# Patient Record
Sex: MALE | Race: WHITE | NOT HISPANIC OR LATINO | Employment: FULL TIME | URBAN - METROPOLITAN AREA
[De-identification: names, ages, dates, MRNs, and addresses within clinical notes are randomized per-mention and may not be internally consistent; named-entity substitution may affect disease eponyms.]

---

## 2017-04-20 ENCOUNTER — OFFICE VISIT - RIVER FALLS (OUTPATIENT)
Dept: FAMILY MEDICINE | Facility: CLINIC | Age: 22
End: 2017-04-20

## 2017-04-20 ASSESSMENT — MIFFLIN-ST. JEOR: SCORE: 2191.46

## 2017-08-29 ENCOUNTER — OFFICE VISIT - RIVER FALLS (OUTPATIENT)
Dept: FAMILY MEDICINE | Facility: CLINIC | Age: 22
End: 2017-08-29

## 2017-08-29 ASSESSMENT — MIFFLIN-ST. JEOR: SCORE: 2141.56

## 2017-11-20 ENCOUNTER — COMMUNICATION - RIVER FALLS (OUTPATIENT)
Dept: FAMILY MEDICINE | Facility: CLINIC | Age: 22
End: 2017-11-20

## 2017-11-20 ENCOUNTER — OFFICE VISIT - RIVER FALLS (OUTPATIENT)
Dept: FAMILY MEDICINE | Facility: CLINIC | Age: 22
End: 2017-11-20

## 2017-11-20 ASSESSMENT — MIFFLIN-ST. JEOR: SCORE: 2167.76

## 2018-01-23 ENCOUNTER — HOME INFUSION (PRE-WILLOW HOME INFUSION) (OUTPATIENT)
Dept: PHARMACY | Facility: CLINIC | Age: 23
End: 2018-01-23

## 2018-02-12 ENCOUNTER — OFFICE VISIT - RIVER FALLS (OUTPATIENT)
Dept: FAMILY MEDICINE | Facility: CLINIC | Age: 23
End: 2018-02-12

## 2018-02-12 ASSESSMENT — MIFFLIN-ST. JEOR: SCORE: 2153.24

## 2018-02-16 ENCOUNTER — TELEPHONE (OUTPATIENT)
Dept: PEDIATRICS | Facility: CLINIC | Age: 23
End: 2018-02-16

## 2018-02-16 DIAGNOSIS — E70.1 PHENYLKETONURIA (PKU) (H): ICD-10-CM

## 2018-02-16 NOTE — TELEPHONE ENCOUNTER
"Kit Orona is a 22 year old male patient with PKU. Last clinic visit at the PKU clinic was 5/24/16    His recent phe levels have been as follows: (reference range:  <6 mg/dL)  5/24/16: 15.8 mg/dL  5/17/15: 13 mg/dL  7/9/14: 15.5 mg/dL    He used to take 21 tablets of 100mg Kuvan (total of 2100mg) for PKU management, but he stopped taking Kuvan for many months. He said he would like to re-start his Kuvan therapy.    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 Encouraged patient to send phe levels to check his blood phe levels. Patient reported weight is about 140 lbs (~108 kg). 21 tablets of 100mg Kuvan per day would still be appropriate based on this weight. (recommended dose based on 20mg/kg would be between 21 and 22 tablets/day).       Plan:  1. Continue taking 21 tablets of Kuvan 100mg tab every day   2. Send phe levels     Marilu Mansfield, VijayD, Pharmacotherapy Inherited Metabolic Disease (PIMD) Fellow, per collaborative practice agreement with Dr. Nito Richardson PhD MD    "

## 2018-12-07 ENCOUNTER — HOME INFUSION (PRE-WILLOW HOME INFUSION) (OUTPATIENT)
Dept: PHARMACY | Facility: CLINIC | Age: 23
End: 2018-12-07

## 2019-01-02 NOTE — PROGRESS NOTES
This is a recent snapshot of the patient's [facility] medical record.  For current drug dose and complete information and questions, call [phone] or In Basket pool, fv home infusion ([id number])  CSN Number:  [id number]

## 2019-02-28 DIAGNOSIS — E70.1 PHENYLKETONURIA (PKU) (H): ICD-10-CM

## 2019-05-14 ENCOUNTER — OFFICE VISIT - HEALTHEAST (OUTPATIENT)
Dept: FAMILY MEDICINE | Facility: CLINIC | Age: 24
End: 2019-05-14

## 2019-05-14 ENCOUNTER — COMMUNICATION - HEALTHEAST (OUTPATIENT)
Dept: SCHEDULING | Facility: CLINIC | Age: 24
End: 2019-05-14

## 2019-05-14 DIAGNOSIS — R00.2 PALPITATIONS: ICD-10-CM

## 2019-05-14 LAB
ANION GAP SERPL CALCULATED.3IONS-SCNC: 12 MMOL/L (ref 5–18)
BUN SERPL-MCNC: 13 MG/DL (ref 8–22)
CHLORIDE BLD-SCNC: 105 MMOL/L (ref 98–107)
CO2 SERPL-SCNC: 24 MMOL/L (ref 22–31)
CREAT SERPL-MCNC: 1 MG/DL (ref 0.8–1.5)
ERYTHROCYTE [DISTWIDTH] IN BLOOD BY AUTOMATED COUNT: 11.8 % (ref 11–14.5)
GLUCOSE BLD-MCNC: 97 MG/DL (ref 70–125)
HCT VFR BLD AUTO: 46.5 % (ref 40–54)
HGB BLD-MCNC: 15.5 G/DL (ref 14–18)
MCH RBC QN AUTO: 28.5 PG (ref 27–34)
MCHC RBC AUTO-ENTMCNC: 33.2 G/DL (ref 32–36)
MCV RBC AUTO: 86 FL (ref 80–100)
PLATELET # BLD AUTO: 312 THOU/UL (ref 140–440)
PMV BLD AUTO: 7.4 FL (ref 7–10)
POTASSIUM BLD-SCNC: 4.3 MMOL/L (ref 3.5–5.5)
RBC # BLD AUTO: 5.43 MILL/UL (ref 4.4–6.2)
SODIUM SERPL-SCNC: 141 MMOL/L (ref 136–145)
TSH SERPL DL<=0.005 MIU/L-ACNC: 1.63 UIU/ML (ref 0.3–5)
WBC: 7.3 THOU/UL (ref 4–11)

## 2019-05-15 LAB
ATRIAL RATE - MUSE: 65 BPM
DIASTOLIC BLOOD PRESSURE - MUSE: NORMAL MMHG
INTERPRETATION ECG - MUSE: NORMAL
P AXIS - MUSE: 59 DEGREES
PR INTERVAL - MUSE: 156 MS
QRS DURATION - MUSE: 102 MS
QT - MUSE: 402 MS
QTC - MUSE: 418 MS
R AXIS - MUSE: 57 DEGREES
SYSTOLIC BLOOD PRESSURE - MUSE: NORMAL MMHG
T AXIS - MUSE: 45 DEGREES
VENTRICULAR RATE- MUSE: 65 BPM

## 2019-07-31 ENCOUNTER — HOSPITAL ENCOUNTER (OUTPATIENT)
Facility: CLINIC | Age: 24
Setting detail: SPECIMEN
Discharge: HOME | End: 2019-07-31
Admitting: PEDIATRICS

## 2019-07-31 PROCEDURE — 84510 ASSAY OF TYROSINE: CPT | Performed by: PEDIATRICS

## 2019-08-07 LAB
PHE SERPL-MCNC: 16 MG/DL (ref 0.5–1.6)
TYROSINE SERPL-MCNC: 1.8 MG/DL (ref 0.6–2.4)

## 2019-08-09 ENCOUNTER — TELEPHONE (OUTPATIENT)
Dept: NUTRITION | Facility: CLINIC | Age: 24
End: 2019-08-09

## 2019-09-26 ENCOUNTER — HOME INFUSION (PRE-WILLOW HOME INFUSION) (OUTPATIENT)
Dept: PHARMACY | Facility: CLINIC | Age: 24
End: 2019-09-26

## 2019-10-02 ENCOUNTER — COMMUNICATION - HEALTHEAST (OUTPATIENT)
Dept: PEDIATRICS | Facility: CLINIC | Age: 24
End: 2019-10-02

## 2019-10-03 NOTE — PROGRESS NOTES
This is a recent snapshot of the patient's [facility] medical record.  For current drug dose and complete information and questions, call [phone] or In Basket pool, fv home infusion ([id number])  CSN Number:  [id number]

## 2020-07-09 ENCOUNTER — HOME INFUSION (PRE-WILLOW HOME INFUSION) (OUTPATIENT)
Dept: PHARMACY | Facility: CLINIC | Age: 25
End: 2020-07-09

## 2020-07-16 NOTE — PROGRESS NOTES
This is a recent snapshot of the patient's [facility] medical record.  For current drug dose and complete information and questions, call [phone] or In Basket pool, fv home infusion ([id number])  CSN Number:  [id number]

## 2020-11-03 ENCOUNTER — COMMUNICATION - HEALTHEAST (OUTPATIENT)
Dept: INTERNAL MEDICINE | Facility: CLINIC | Age: 25
End: 2020-11-03

## 2021-01-25 ENCOUNTER — HOME INFUSION (PRE-WILLOW HOME INFUSION) (OUTPATIENT)
Dept: PHARMACY | Facility: CLINIC | Age: 26
End: 2021-01-25

## 2021-01-28 NOTE — PROGRESS NOTES
This is a recent snapshot of the patient's [facility] medical record.  For current drug dose and complete information and questions, call [phone] or In Basket pool, fv home infusion ([id number])  CSN Number:  [id number]

## 2021-05-28 NOTE — PATIENT INSTRUCTIONS - HE
I will call you with lab results.    Recommend seeing primary care if this persists to get a holter monitor evaluation.

## 2021-05-28 NOTE — TELEPHONE ENCOUNTER
Pt called in states he has palpitation 2 days  ago.  The symptom was happened last Sunday night until Monday morning.  The symptom was constant.  Pt pulse is 88  Pt states he has family history of palpitation.  Pt states the symptom is recurrent.  Pt states he didn't have history of heart disease.  No dizziness, no chest pain, no sweating, no difficulty breathing.  The disposition is home care.  Care advice given per protocol.  Patient agrees with care advice given.   Agreed to call back if he has additional symptoms or questions.    Augustine Izaguirre RN, Care Connection Triage/Med Refill 5/14/2019 3:37 PM      Reason for Disposition    Palpitations    Protocols used: HEART RATE AND HEARTBEAT [id number]

## 2021-05-28 NOTE — PROGRESS NOTES
ASSESSMENT:   1. Palpitations  Electrocardiogram Perform and Read    HM2(CBC w/o Differential)    ISTAT CHEM 7 (HE CLINIC ONLY)    Thyroid Stimulating Hormone (TSH)       PLAN:  Patient has remained asymptomatic for the past 2 days, with one episode of intermittent palpitations 2 nights ago following heavy caffeine consumption.  EKG was obtained today, no ectopy or ischemic changes are identified.  Labs were obtained, no significant anemia or electrolyte derangement.  TSH is pending, patient can be contacted once those results are available.  Did recommend decreasing caffeine intake, avoiding alcohol.  Also recommended that should symptoms persist, patient should be seen by primary care for likely Holter monitor evaluation.  He is amenable with this plan.    I discussed red flag symptoms, return precautions to clinic/ER and follow up care with patient/parent.  Expected clinical course, symptomatic cares advised. Questions answered. Patient/parent amenable with plan.    Patient Instructions:  Patient Instructions   I will call you with lab results.    Recommend seeing primary care if this persists to get a holter monitor evaluation.      SUBJECTIVE:   Kit Orona is a 23 y.o. male who presents today with report of fluttering in his chest on Sunday (today is Tuesday).  Feels it lasted about 12 hours then resolved, has not occurred since.  No associated chest pain, shortness of breath.  Did feel lightheaded immediately when it began, but that resolved with rest.  Was playing softball when it began.  Does drink coffee, pop and energy drinks.  Did consume an energy drink containing 300mg caffeine about 3 hours prior to start of the game. Non-smoker. No illicit drugs.  Drinks on weekends, did have several drinks the evening  Notes his dad and grandfather both have A-fib and is concerned for this.        ROS:  Comprehensive 12 pt ROS completed, positives noted in HPI, otherwise negative.      Past Medical  History:  Patient Active Problem List   Diagnosis     Phenylketonuria       Surgical History:  No past surgical history on file.        Family History:  Family History   Problem Relation Age of Onset     No Medical Problems Mother      No Medical Problems Father      No Medical Problems Sister      No Medical Problems Brother        Reviewed; Non-contributory    Social History     Tobacco Use   Smoking Status Never Smoker   Smokeless Tobacco Never Used       Current Medications:  Current Outpatient Medications on File Prior to Visit   Medication Sig Dispense Refill     ibuprofen (ADVIL,MOTRIN) 200 MG tablet Take 200 mg by mouth every 6 (six) hours as needed for pain.       No current facility-administered medications on file prior to visit.        Allergies:   No Known Allergies    OBJECTIVE:   Vitals:    05/14/19 1614 05/14/19 1618   BP: 145/83 126/78   Patient Site: Right Arm Right Arm   Patient Position: Sitting Sitting   Cuff Size: Adult Large Adult Large   Pulse: 85    Resp: 16    Temp: 97.9  F (36.6  C)    TempSrc: Oral    SpO2: 98%    Weight: (!) 269 lb (122 kg)      Physical exam reveals a pleasant 23 y.o. male.   General: Appears healthy, alert and cooperative. Non-toxic appearance.  Pulmonary/Chest: Chest is clear, no wheezing, rhonchi or rales. Symmetric air entry throughout both lung fields. Symmetrical chest wall movement.  Heart: regular rate and rhythm, no murmur, rub or gallop  Abdomen: soft, nontender. No masses or organomegaly  Neuro: Alert, oriented. Non-focal.  Skin: pink, warm, dry, and without lesions on limited skin exam. No rashes.  Psychiatric: Normal mood and affect.  Normal judgement and thought content. Normal behavior.       RADIOLOGY    none  LABORATORY STUDIES    Recent Results (from the past 24 hour(s))   Electrocardiogram Perform and Read   Result Value Ref Range    SYSTOLIC BLOOD PRESSURE  mmHg    DIASTOLIC BLOOD PRESSURE  mmHg    VENTRICULAR RATE 65 BPM    ATRIAL RATE 65 BPM    P-R  INTERVAL 156 ms    QRS DURATION 102 ms    Q-T INTERVAL 402 ms    QTC CALCULATION (BEZET) 418 ms    P Axis 59 degrees    R AXIS 57 degrees    T AXIS 45 degrees    MUSE DIAGNOSIS       Normal sinus rhythm with sinus arrhythmia  Normal ECG  No previous ECGs available     HM2(CBC w/o Differential)   Result Value Ref Range    WBC 7.3 4.0 - 11.0 thou/uL    RBC 5.43 4.40 - 6.20 mill/uL    Hemoglobin 15.5 14.0 - 18.0 g/dL    Hematocrit 46.5 40.0 - 54.0 %    MCV 86 80 - 100 fL    MCH 28.5 27.0 - 34.0 pg    MCHC 33.2 32.0 - 36.0 g/dL    RDW 11.8 11.0 - 14.5 %    Platelets 312 140 - 440 thou/uL    MPV 7.4 7.0 - 10.0 fL   ISTAT CHEM 7 (HE CLINIC ONLY)   Result Value Ref Range    Sodium 141 136 - 145 mmol/L    Potassium 4.3 3.5 - 5.5 mmol/L    CO2 24 22 - 31 mmol/L    Chloride 105 98 - 107 mmol/L    Anion Gap, Calculation 12 5 - 18 mmol/L    Glucose 97 70 - 125 mg/dL    BUN 13 8 - 22 mg/dL    Creatinine 1.00 0.80 - 1.50 mg/dL           Lorna Choudhury, CNP

## 2021-06-03 VITALS — WEIGHT: 269 LBS | BODY MASS INDEX: 35.65 KG/M2

## 2021-06-12 NOTE — TELEPHONE ENCOUNTER
Referral Request  Type of referral: PKU specialist  Who s requesting: Patient  Why the request: Genetic testing  Have you been seen for this request: N/A  Does patient have a preference on a group/provider? [facility] Fax [phone]  Okay to leave a detailed message?  Yes

## 2021-06-12 NOTE — TELEPHONE ENCOUNTER
"I am sorry but Kit needs to establish with an adult provider for this referral.   The PCP on his chart is a pediatric metabolic specialist, not a primary care provider, please change to \"no PCP.\"  Please call him and help schedule a visit with a primary care provider. Thanks.  "

## 2021-06-12 NOTE — TELEPHONE ENCOUNTER
Called to Kit and he will check with his insurance and see which clinic he can go to establish care for this. Wanda Little LPN

## 2021-09-11 ENCOUNTER — HEALTH MAINTENANCE LETTER (OUTPATIENT)
Age: 26
End: 2021-09-11

## 2022-01-25 ENCOUNTER — TELEPHONE (OUTPATIENT)
Dept: CONSULT | Facility: CLINIC | Age: 27
End: 2022-01-25
Payer: COMMERCIAL

## 2022-01-25 NOTE — TELEPHONE ENCOUNTER
I left Kit Orona a voice message about setting a virtual visit in the PKU clinic to meet with Dr. Richardson for sometime in March. Dr. Richardson would like to follow-up on how the patient s progress is coming along. Please feel free to contact Dr. Richardson s coordinator Lianet at [phone] or the Genetics appointment scheduling at [phone] and we ll be happy to help coordinate an appointment with the patient.       Thank you,   Lianet Kapoor

## 2022-02-11 VITALS
BODY MASS INDEX: 34.21 KG/M2 | TEMPERATURE: 100.6 F | DIASTOLIC BLOOD PRESSURE: 52 MMHG | HEIGHT: 72 IN | HEART RATE: 88 BPM | SYSTOLIC BLOOD PRESSURE: 132 MMHG | WEIGHT: 252.6 LBS

## 2022-02-11 VITALS
WEIGHT: 244.2 LBS | HEIGHT: 73 IN | SYSTOLIC BLOOD PRESSURE: 112 MMHG | HEART RATE: 65 BPM | BODY MASS INDEX: 32.37 KG/M2 | DIASTOLIC BLOOD PRESSURE: 74 MMHG | OXYGEN SATURATION: 99 %

## 2022-02-11 VITALS
OXYGEN SATURATION: 98 % | BODY MASS INDEX: 33.78 KG/M2 | HEART RATE: 75 BPM | WEIGHT: 249.4 LBS | DIASTOLIC BLOOD PRESSURE: 64 MMHG | TEMPERATURE: 98.5 F | SYSTOLIC BLOOD PRESSURE: 120 MMHG | HEIGHT: 72 IN

## 2022-02-11 VITALS
SYSTOLIC BLOOD PRESSURE: 126 MMHG | BODY MASS INDEX: 33.82 KG/M2 | DIASTOLIC BLOOD PRESSURE: 62 MMHG | WEIGHT: 255.2 LBS | HEIGHT: 73 IN | HEART RATE: 68 BPM

## 2022-02-16 NOTE — PROGRESS NOTES
Patient:   CLIF GEE            MRN: [id number]            FIN: [id number]               Age:   21 years     Sex:  Male     :  1995   Associated Diagnoses:   Multiple benign nevi   Author:   Hussein Wolfe MD      Visit Information      Date of Service: 2017 01:00 pm  Performing Location: [facility]  Encounter#: [id number]      Primary Care Provider (PCP):  NONE ,       Referring Provider:  Hussein Wolfe MD    NPI# [id number]      Chief Complaint   2017 1:14 PM CDT    Pt here to have moles checked, states on his back, GF is worried about them, he thinks they are ok, he denies any changing in the moles        History of Present Illness        The patient presents for a screening exam.  The screening exam is requested due to no family history of basal cell carcinoma, no family history of squamous cell carcinoma, no family history of melanoma, no family history of dysplastic nevi, no personal history of basal cell carcinoma, no personal history of squamous cell carcinoma, no personal history of melanoma and no personal history of dysplastic nevi.  Additional pertinent history: patient has a previous history of tanning bed use and no allergies.        Review of Systems   Integumentary      Health Status   Allergies:    Allergic Reactions (Selected)  No Known Medication Allergies   Medications:  (Selected)      Problem list:    All Problems  Obesity / SNOMED CT 7424809430 / Probable      Histories   Family History:    Entire family history is negative.   Procedure history:    None (SNOMED CT 880445171).      Physical Examination   Vital Signs   2017 1:14 PM CDT Peripheral Pulse Rate 65 bpm    Pulse Site Radial artery    Systolic Blood Pressure 112 mmHg    Diastolic Blood Pressure 74 mmHg    Mean Arterial Pressure 87 mmHg    BP Site Right arm    Oxygen Saturation 99 %      Measurements from flowsheet : Measurements   2017 1:14 PM CDT Height Measured - Standard  73 in    Weight Measured - Standard 244.2 lb    BSA 2.39 m2    Body Mass Index 32.21 kg/m2      General:  Alert and oriented, Skin.         Appearance: Within normal limits.    Integumentary:  Warm, Dry, Intact.         Skin phototype: Skin phototype- II. Usually burns, sometimes tans. Fair skin.    Integumentary exam.      Head and neck:  multiple benign nevi    Upper extremities:  multiple benign nevi    Torso:  multiple benign nevi    Lower extremities:  multiple benign nevi      Impression and Plan   Diagnosis     Multiple benign nevi (TNM45-ZB D22.9).     Course:  Progressing as expected.    Plan:  Patient counseled on sun protection, melanoma, basal cell carcinoma, and squamous cell carcinoma.  Patient education materials given.  Advise yearly skin checks..         Follow-up: In 12 months.    Patient Instructions:  SHREYAS martinez (POLI).

## 2022-02-16 NOTE — PROGRESS NOTES
Patient:   CLIF GEE            MRN: [id number]            FIN: [id number]               Age:   21 years     Sex:  Male     :  1995   Associated Diagnoses:   Rhomboid muscle strain   Author:   Anand Lino MD      Chief Complaint   2017 1:17 PM CDT    c/o upper right side back pain since Monday. Has been icing and putting heat on it.      History of Present Illness   See chief complaint as noted above and confirmed with the patient   21year old patient presents today with complaints of back pain.  The pain started 3 days ago.  Pain started after football practice earlier this week. He states this practice was a little more intense than normal and he felt really tight during practice.  Pain Location:  right upper back around shoulder blade  Pain Exacerbated by:  bending over  Pain Relieved by:  heat, icing, and message  Previous Treatment?:  none  Has seen a chiropractor in the past for lower back pain in the past.      Review of Systems   Constitutional:  Negative.    Respiratory:  Negative.    Cardiovascular:  Negative.    Gastrointestinal:  Negative.    Musculoskeletal:  Back pain.    Integumentary:  Negative.    Psychiatric:  Negative.              Health Status   Allergies:    Allergic Reactions (Selected)  No Known Medication Allergies   Medications:  (Selected)      Problem list:    All Problems  Obesity / SNOMED CT 6516472529 / Probable      Histories   Past Medical History:    No active or resolved past medical history items have been selected or recorded.   Family History:    Entire family history is negative.   Procedure history:    None ([id number]).   Social History:        Alcohol Assessment            Current, 1-2 times per week                     Comments:                      2016 - Jim Richardson CMA                     6-8 drinks per time      Tobacco Assessment            Never      Substance Abuse Assessment            Never      Employment and Education Assessment             Student      Home and Environment Assessment            Marital status: Single.  Risks in environment: Does not wear helmet, Smoke/CO detectors absent.      Nutrition and Health Assessment            Type of diet: Regular.      Exercise and Physical Activity Assessment            Exercise frequency: 1-2 times/week.      Sexual Assessment            Sexually active: No.        Physical Examination   Vital Signs   4/20/2017 1:17 PM CDT Peripheral Pulse Rate 68 bpm    Systolic Blood Pressure 126 mmHg    Diastolic Blood Pressure 62 mmHg    Mean Arterial Pressure 83 mmHg      Measurements from flowsheet : Measurements   4/20/2017 1:17 PM CDT Height Measured - Standard 73 in    Weight Measured - Standard 255.2 lb    BSA 2.44 m2    Body Mass Index 33.67 kg/m2      General:  Alert and oriented, No acute distress.    Eye:  Pupils are equal, round and reactive to light, Normal conjunctiva.    HENT:  Oral mucosa is moist.    Neck:  Supple.    Respiratory:  Respirations are non-labored.    Cardiovascular:  Normal rate, Regular rhythm, No edema.    Gastrointestinal:  Non-distended.    Integumentary:  Warm, No rash.    Psychiatric:  Cooperative, Appropriate mood & affect, Normal judgment.       Review / Management   Results review      Impression and Plan   Diagnosis     Rhomboid muscle strain (LAB30-KQ S29.012A).     Plan:  Patient has symptoms consistent with musculoskeletal back pain.  Doubt fracture.  No evidence of any high risk symptoms.  Discussed options for treatment including NSAIDs, ice, heat, and stretching.  Also can consider physical therapy, massage or chiropractic care if interested.    If pain continues or worsens in severity patient instructed to make follow up appointment, urgently if any numbness, tingling,  loss of bowel or bladder develop.  Patient in agreement..    I, Elisabeth Crowell CMA, acted solely as a scribe for, and in the presence of Dr. Anand Lino who performed the service.

## 2022-02-16 NOTE — PROGRESS NOTES
Patient:   CILF GEE            MRN: [id number]            FIN: [id number]               Age:   22 years     Sex:  Male     :  1995   Associated Diagnoses:   Viral URI with cough   Author:   Hussein Wallace MD      Chief Complaint   2018 8:06 AM CST    pt c/o cough with yellow/green mucus since Friday, previously had flu like symptoms        History of Present Illness   22 year old male presents with cough. A week ago began with respiratory flu. Had a cough that hurt. Also had bodyaches. Flu symptoms went away. Then developed a productive cough that is stable. He is actually getting a little better.      Review of Systems   Constitutional:  No fever.    Respiratory:  No shortness of breath.    Gastrointestinal:  No vomiting.    Integumentary:  No rash.       Health Status   Allergies:    Allergic Reactions (Selected)  No Known Medication Allergies   Medications:    Medications          No Recorded Medications     Problem list:    All Problems  Obesity / SNOMED CT 8643390922 / Probable      Histories   Family History:    Entire family history is negative.   Social History: [employer] student, home [place], [place]      Physical Examination   Vital Signs   2018 8:06 AM CST Temperature Tympanic 98.5 DegF    Peripheral Pulse Rate 75 bpm    Pulse Site Radial artery    HR Method Manual    Systolic Blood Pressure 120 mmHg    Diastolic Blood Pressure 64 mmHg    Mean Arterial Pressure 83 mmHg    BP Site Right arm    BP Method Manual    Oxygen Saturation 98 %      Measurements from flowsheet : Measurements   2018 8:06 AM CST Height Measured - Standard 72.25 in    Weight Measured - Standard 249.4 lb    BSA 2.4 m2    Body Mass Index 33.59 kg/m2  HI      General:  No acute distress.    HENT:  No pharyngeal erythema.    Neck:  No lymphadenopathy.    Respiratory:  Lungs are clear to auscultation.    Cardiovascular:  Normal rate, Regular rhythm.    Musculoskeletal:  Normal gait.    Psychiatric:  Appropriate  mood & affect.       Impression and Plan   Bronchitis:       Diagnosis: Viral URI with cough (UEL09-CT J06.9).    Viral URI with cough: symptomatic with treatment and follow up if not improving

## 2022-02-27 ENCOUNTER — LAB (OUTPATIENT)
Dept: LAB | Facility: CLINIC | Age: 27
End: 2022-02-27
Payer: COMMERCIAL

## 2022-02-27 DIAGNOSIS — E70.1 PHENYLKETONURIA (PKU) (H): ICD-10-CM

## 2022-02-27 PROCEDURE — 36416 COLLJ CAPILLARY BLOOD SPEC: CPT

## 2022-03-01 ENCOUNTER — VIRTUAL VISIT (OUTPATIENT)
Dept: PEDIATRICS | Facility: CLINIC | Age: 27
End: 2022-03-01
Attending: PEDIATRICS
Payer: COMMERCIAL

## 2022-03-01 ENCOUNTER — TELEPHONE (OUTPATIENT)
Dept: CONSULT | Facility: CLINIC | Age: 27
End: 2022-03-01
Payer: COMMERCIAL

## 2022-03-01 ENCOUNTER — TELEPHONE (OUTPATIENT)
Dept: CONSULT | Facility: CLINIC | Age: 27
End: 2022-03-01

## 2022-03-01 VITALS — BODY MASS INDEX: 33.75 KG/M2 | WEIGHT: 263 LBS | HEIGHT: 74 IN

## 2022-03-01 DIAGNOSIS — E70.1 PHENYLKETONURIA (PKU) (H): Primary | ICD-10-CM

## 2022-03-01 PROCEDURE — 99203 OFFICE O/P NEW LOW 30 MIN: CPT | Mod: GT | Performed by: PEDIATRICS

## 2022-03-01 RX ORDER — PEGVALIASE-PQPZ 2.5 MG/.5ML
INJECTION, SOLUTION SUBCUTANEOUS
Qty: 3 ML | Refills: 0 | Status: SHIPPED | OUTPATIENT
Start: 2022-03-01 | End: 2022-04-13 | Stop reason: DRUGHIGH

## 2022-03-01 RX ORDER — PEGVALIASE-PQPZ 10 MG/.5ML
INJECTION, SOLUTION SUBCUTANEOUS
Qty: 7 ML | Refills: 0 | Status: SHIPPED | OUTPATIENT
Start: 2022-03-01 | End: 2022-08-12

## 2022-03-01 RX ORDER — CETIRIZINE HYDROCHLORIDE 10 MG/1
10 TABLET ORAL DAILY
Qty: 30 TABLET | Refills: 11 | Status: SHIPPED | OUTPATIENT
Start: 2022-03-01

## 2022-03-01 RX ORDER — EPINEPHRINE 0.3 MG/.3ML
INJECTION SUBCUTANEOUS
Qty: 0.6 ML | Refills: 11 | Status: SHIPPED | OUTPATIENT
Start: 2022-03-01 | End: 2024-01-23

## 2022-03-01 RX ORDER — FAMOTIDINE 10 MG
10 TABLET ORAL 2 TIMES DAILY
Qty: 60 TABLET | Refills: 11 | Status: SHIPPED | OUTPATIENT
Start: 2022-03-01 | End: 2024-01-23

## 2022-03-01 RX ORDER — PEGVALIASE-PQPZ 20 MG/ML
INJECTION, SOLUTION SUBCUTANEOUS
Qty: 30 ML | Refills: 6 | Status: SHIPPED | OUTPATIENT
Start: 2022-03-01 | End: 2022-08-12

## 2022-03-01 ASSESSMENT — PAIN SCALES - GENERAL: PAINLEVEL: NO PAIN (0)

## 2022-03-01 NOTE — LETTER
"  3/1/2022      RE: Kit Orona  [address] [address]               Phenylketonuria (PKU) Clinic  [facility] [address] [place] [address]  Phone: [phone]  Fax: [phone]  Date: 2022      Patient:  Kit Orona   :   1995   MRN:     [id number]      Kit Orona  [address] [address]    Dear Kit Orona,    CHIEF COMPLAINT:     I had the pleasure of seeing Kit Orona in the PKU and Maternal PKU Clinic at the [facility] regarding phenylketonuria (PKU). This here for evaluation and treatment.      Since the last visit, there have been no hospitalizations, emergency department visits, or other major changes in medical care.  Reji stopped taking Kuvan and would like to start Palynziq injectable medication.  His last phe level was in 2019 with a value of 16 mg/dL    PAST MEDICAL HISTORY:    These list of past medical problems includes:    Patient Active Problem List   Diagnosis     Phenylketonuria (PKU) - Kuvan responsive     FAMILY HISTORY: A brief family medical history was reviewed.  MEDICATIONS:   Current Outpatient Medications   Medication Sig     Amino Acids (XPHE MAXAMUM) POWD Take 200 g by mouth daily     sapropterin dihydrochloride (KUVAN) 100 MG solu-tablet Take 21 tablets of Kuvan 100 mg for a total dose of 2100 mg by mouth once daily with a meal.     No current facility-administered medications for this visit.     REVIEW OF SYSTEMS: The review of systems negative for new eye, ear, heart, lung, liver, spleen, gastrointestinal, bone, muscle, integumentary, endocrinologic, brain or psychiatric issues except as noted above.  PHYSICAL EXAMINATION:  Demographics: Ht 6' 2\" (188 cm)   Wt 263 lb (119.3 kg)   BMI 33.77 kg/m    General: The patient is oriented to person, place and time at an age-appropriate manner.   HEENT: The facial features are normal and symmetric.   The gaze is conjugate " "and extraocular motions are full and intact.The pupils are equal, round and reactive to light.  The ears are of normal position and configuration and hearing is grossly normal.  The oropharynx is benign and the tongue protrudes normally without fasciculations.  Neck: The neck is supple with full range of motion  Chest: The chest is of normal configuration and clear by auscultation.   Heart: The patient appears to be well perfused.  Abdomen: Not examined.   Extremities: The extremities are of normal configuration without contractures nor hyperlaxities. Strength and tone appear to be normal and symmetric.  Integument: The integument is  of normal appearance without significant changes in pigmentation, birthmarks, or lesions.  Neurologic:  Mental Status Exam:  Alert, awake. Fully oriented. No dysarthria, no dysphasia. Speech of normal fluency.  Cranial Nerves:  EOMs intact, no nystagmus, facial movements symmetric.   Motor:  Normal movement of all four extremities, no atrophy or fasciculations.No tremors.  Reflexes:  Not done.  Gait:  Normal gait in limited space of camera field.    LABORATORY RESULTS: Previous studies showed pathologically elevated blood phenylalanine levels as well as specific PAH mutatons and excluded disorders of biopterin recycling. Laboratory studies from the past year were reviewed.  ASSESSMENT:  1. Phenylketonuria (PKU)  2. Was on Kuvan and would like to start Palynziq  3. Kuvan responder  4. On PKU formula  5. Diet controlled PKU  6. Inadequate (phe >15 mgldL)  control with blood phe levels aiming at levels of 6 mg/dL or lower.    PLAN/RECOMMENDATIONS:  1. Continue low phenylalanine diet  2. Begin Palynziq  3. Send blood \"tyrosine and phenylalanine\" levels monthly  4. Clinical pharmacotherapy consult with Medardo Monroe, Pharm.D.  5. Metabolic PKU Dietician Consult today for regular diet assessment and nutritional management including the patient's prescribed phenylalanine intake.  6. Return to " PKU Clinic in 3 months.    FOLLOW-UP PLAN:  If you are returning to clinic to review specific laboratory tests, please call the Genetic Counselor (see phone numbers below) to confirm that we have received all of the results from reference laboratories prior to your appointment. If we have not received all of the test results, please discuss re-scheduling your appointment.    With warmest regards,     Zach Richardson PhD MD  Medical Director, PKU Clinic  Professor of Pediatrics, Medical School, and  Experimental and Clinical Pharmacology, [place] [facility]    Appointments: [phone]      Monday mornings: [facility] for Lysosomal Diseases Clinic   Tuesday mornings: PKU Clinic, Metabolism Clinic, and Genetics Clinic    Pharmacotherapy Consultant:  Medardo Monroe, PharmD, Pharmacotherapy for Metabolic Disorders (PIMD): [phone]    Genetic Counselor:  Yael Santos MS, [facility] (Genetic counselor): [phone]    Metabolic Dietician:  Alysia Nunn, Registered Dietician: [phone]    Advanced Therapies Clinic Scheduler:  Lianet Kapoor, [phone]    Nurse Coordinator, PKU, Metabolism and Genetics:  Miriam Mason RN, [phone]    Copies:    Kit Orona  [address] [facility] [address] [facility] [address]

## 2022-03-01 NOTE — TELEPHONE ENCOUNTER
PA Initiation    Medication: Palynziq 2.5mg-PA initiated  Insurance Company: [employer] ([facility]) - Phone [phone] Fax [phone]  Pharmacy Filling the Rx:    Filling Pharmacy Phone:    Filling Pharmacy Fax:    Start Date: 3/1/2022

## 2022-03-01 NOTE — PROGRESS NOTES
Kit is a 26 year old who is being evaluated via a billable video visit.      How would you like to obtain your AVS? MyChart  If the video visit is dropped, the invitation should be resent by: Send to e-mail at: [email or web address] or [email or web address]   Will anyone else be joining your video visit? Dr. Medardo Monroe (Clinical Pharmacist), Dr. Helen Stallworth (Research physician) and Dr. Leslee Judge (Clinical physician)    START:    END:            Aby Marina M.A.                Phenylketonuria (PKU) Clinic  [facility] [address] [place] [address]  Phone: [phone]  Fax: [phone]  Date: 2022      Patient:  Kit Orona   :   1995   MRN:     [id number]      Kit Orona  [address] [facility] [address]    Dear Kit ROY Orona,    CHIEF COMPLAINT:     I had the pleasure of seeing Kit Orona in the PKU and Maternal PKU Clinic at the [facility] regarding phenylketonuria (PKU). This here for evaluation and treatment.      Since the last visit, there have been no hospitalizations, emergency department visits, or other major changes in medical care.  Reji stopped taking Kuvan and would like to start Palynziq injectable medication.  His last phe level was in 2019 with a value of 16 mg/dL    PAST MEDICAL HISTORY:    These list of past medical problems includes:    Patient Active Problem List   Diagnosis     Phenylketonuria (PKU) - Kuvan responsive     FAMILY HISTORY: A brief family medical history was reviewed.  MEDICATIONS:   Current Outpatient Medications   Medication Sig     Amino Acids (XPHE MAXAMUM) POWD Take 200 g by mouth daily     sapropterin dihydrochloride (KUVAN) 100 MG solu-tablet Take 21 tablets of Kuvan 100 mg for a total dose of 2100 mg by mouth once daily with a meal.     No current facility-administered medications for this visit.     REVIEW OF SYSTEMS: The review of systems negative for new eye,  "ear, heart, lung, liver, spleen, gastrointestinal, bone, muscle, integumentary, endocrinologic, brain or psychiatric issues except as noted above.  PHYSICAL EXAMINATION:  Demographics: Ht 6' 2\" (188 cm)   Wt 263 lb (119.3 kg)   BMI 33.77 kg/m    General: The patient is oriented to person, place and time at an age-appropriate manner.   HEENT: The facial features are normal and symmetric.   The gaze is conjugate and extraocular motions are full and intact.The pupils are equal, round and reactive to light.  The ears are of normal position and configuration and hearing is grossly normal.  The oropharynx is benign and the tongue protrudes normally without fasciculations.  Neck: The neck is supple with full range of motion  Chest: The chest is of normal configuration and clear by auscultation.   Heart: The patient appears to be well perfused.  Abdomen: Not examined.   Extremities: The extremities are of normal configuration without contractures nor hyperlaxities. Strength and tone appear to be normal and symmetric.  Integument: The integument is  of normal appearance without significant changes in pigmentation, birthmarks, or lesions.  Neurologic:  Mental Status Exam:  Alert, awake. Fully oriented. No dysarthria, no dysphasia. Speech of normal fluency.  Cranial Nerves:  EOMs intact, no nystagmus, facial movements symmetric.   Motor:  Normal movement of all four extremities, no atrophy or fasciculations.No tremors.  Reflexes:  Not done.  Gait:  Normal gait in limited space of camera field.    LABORATORY RESULTS: Previous studies showed pathologically elevated blood phenylalanine levels as well as specific PAH mutatons and excluded disorders of biopterin recycling. Laboratory studies from the past year were reviewed.  ASSESSMENT:  1. Phenylketonuria (PKU)  2. Was on Kuvan and would like to start Palynziq  3. Kuvan responder  4. On PKU formula  5. Diet controlled PKU  6. Inadequate (phe >15 mgldL)  control with blood phe " "levels aiming at levels of 6 mg/dL or lower.    PLAN/RECOMMENDATIONS:  1. Continue low phenylalanine diet  2. Begin Palynziq  3. Send blood \"tyrosine and phenylalanine\" levels monthly  4. Clinical pharmacotherapy consult with Medardo Monroe, Pharm.D.  5. Metabolic PKU Dietician Consult today for regular diet assessment and nutritional management including the patient's prescribed phenylalanine intake.  6. Return to PKU Clinic in 3 months.    FOLLOW-UP PLAN:  If you are returning to clinic to review specific laboratory tests, please call the Genetic Counselor (see phone numbers below) to confirm that we have received all of the results from reference laboratories prior to your appointment. If we have not received all of the test results, please discuss re-scheduling your appointment.    With warmest regards,     Zach Richardson PhD MD  Medical Director, PKU Clinic  Professor of Pediatrics, Medical School, and  Experimental and Clinical Pharmacology, College [employer]    Appointments: [phone]      Monday mornings: Advanced Therapies for Lysosomal Diseases Clinic   Tuesday mornings: PKU Clinic, Metabolism Clinic, and Genetics Clinic    Pharmacotherapy Consultant:  Medardo Monroe, VijayD, Pharmacotherapy for Metabolic Disorders (PIMD): [phone]    Genetic Counselor:  Yael Santos MS, [facility] (Genetic counselor): [phone]    Metabolic Dietician:  Alysia Nunn, Registered Dietician: [phone]    Advanced Therapies Clinic Scheduler:  Lianet Kapoor [phone]    Nurse Coordinator, PKU, Metabolism and Genetics:  Miriam Mason RN, [phone]    Copies:    Kit Orona  [address] [place] [id number]           "

## 2022-03-01 NOTE — TELEPHONE ENCOUNTER
I was unable to reach Kit and had left him a voice message about settling up a follow up virtual visit in the PKU clinic to meet with Dr. Richardson. Dr. Richardson had requested to see Kit back in three months. Kit can reach me at [phone] and I can help get him scheduled to meet with Dr. Richardson.       Thank you,   Lianet Kaporo

## 2022-03-01 NOTE — PATIENT INSTRUCTIONS
"Pediatric Metabolism/PKU Clinic  [facility]  Pediatric Specialty Clinic ([facility])      Formula   We did not make any changes to your formula today.   We will review the lab results and call you with our recommendations.  *Do not make any formula changes without first speaking to your dietician or doctor.       Medications   We did not make any changes to your medications today. We will review the lab results and call you with our recommendations.  *Do not make any medication changes without first speaking to your doctor.  **Please contact us at least one week in advance during regular business hours if you are about to run out of formula or medication       Emergency & Sick Calls   Keep your emergency letter with your child at all times  (at their school, in your vehicles, purse/bag and home, etc).  Consider making a medical alert bracelet.    If your child is unresponsive or has other life threatening medical emergency YOU SHOULD CALL 911.     If your child is NOT ACTING NORMALLY such as: confused or sleepier than normal, having nausea or vomiting, not tolerating their formula or medications, breathing faster than normal, has a fever, diarrhea, or other parental concern CALL US IMMEDIATELY.     ? Call [phone] and ask the  to \"page Genetic Metabolic Physician on-call\"   ? If no one calls you back within 15 minutes call again.        Helpful Numbers   To schedule appointments:  Pediatric Call Center for [facility]: [phone]  Neuropsychology [phone]   Radiology/ Imaging/ Echocardiogram: [phone]   Services:   [phone]     For questions about medications/ supplies or non-urgent medical concerns:        Miriam CAMERON, RN, PHN  Nurse Care Coordinator               Ph: [phone]        Shama Morris APRN, CNP             Ph: [phone]    For questions about your child's nutrition:  Alysia Nunn RD  Ph: [phone]    For " questions about genetic counseling:                    If you have not already done so consider signing up for MeMeMe by speaking with the person at the  on your way out or go to [email or web address] to sign up online.      You should receive a phone call about your next appointment. If you do not receive this within two weeks of your visit, please call [phone].

## 2022-03-03 DIAGNOSIS — E70.1 PHENYLKETONURIA (PKU) (H): Primary | ICD-10-CM

## 2022-03-03 RX ORDER — DIPHENHYDRAMINE HYDROCHLORIDE 50 MG/ML
50 INJECTION INTRAMUSCULAR; INTRAVENOUS
Status: CANCELLED
Start: 2022-03-03

## 2022-03-03 RX ORDER — ALBUTEROL SULFATE 0.83 MG/ML
2.5 SOLUTION RESPIRATORY (INHALATION)
Status: CANCELLED | OUTPATIENT
Start: 2022-03-03

## 2022-03-03 RX ORDER — ACETAMINOPHEN 325 MG/1
650 TABLET ORAL ONCE
Status: CANCELLED
Start: 2022-03-03

## 2022-03-03 RX ORDER — METHYLPREDNISOLONE SODIUM SUCCINATE 125 MG/2ML
125 INJECTION, POWDER, LYOPHILIZED, FOR SOLUTION INTRAMUSCULAR; INTRAVENOUS
Status: CANCELLED
Start: 2022-03-03

## 2022-03-03 RX ORDER — EPINEPHRINE 1 MG/ML
0.3 INJECTION, SOLUTION, CONCENTRATE INTRAVENOUS EVERY 5 MIN PRN
Status: CANCELLED | OUTPATIENT
Start: 2022-03-03

## 2022-03-03 RX ORDER — MONTELUKAST SODIUM 10 MG/1
10 TABLET ORAL ONCE
Status: CANCELLED
Start: 2022-03-03

## 2022-03-03 RX ORDER — CETIRIZINE HYDROCHLORIDE 10 MG/1
10 TABLET ORAL ONCE
Status: CANCELLED
Start: 2022-03-03

## 2022-03-03 RX ORDER — FAMOTIDINE 20 MG/1
40 TABLET, FILM COATED ORAL ONCE
Status: CANCELLED
Start: 2022-03-03

## 2022-03-03 RX ORDER — DIPHENHYDRAMINE HCL 25 MG
50 CAPSULE ORAL ONCE
Status: CANCELLED
Start: 2022-03-03

## 2022-03-03 RX ORDER — ALBUTEROL SULFATE 90 UG/1
1-2 AEROSOL, METERED RESPIRATORY (INHALATION)
Status: CANCELLED
Start: 2022-03-03

## 2022-03-04 LAB
PHE SERPL-MCNC: 17 MG/DL (ref 0.5–1.6)
PHE SERPL-SCNC: 103.2 UMOL/DL (ref 3–10)
TYROSINE SERPL-MCNC: 1.7 MG/DL (ref 0.6–2.4)
TYROSINE SERPL-SCNC: 9.5 UMOL/DL (ref 4–13)

## 2022-03-07 ENCOUNTER — TELEPHONE (OUTPATIENT)
Dept: NUTRITION | Facility: CLINIC | Age: 27
End: 2022-03-07
Payer: COMMERCIAL

## 2022-03-21 ENCOUNTER — LAB (OUTPATIENT)
Dept: LAB | Facility: CLINIC | Age: 27
End: 2022-03-21
Payer: COMMERCIAL

## 2022-03-21 DIAGNOSIS — E70.1 PHENYLKETONURIA (PKU) (H): ICD-10-CM

## 2022-03-22 NOTE — TELEPHONE ENCOUNTER
Prior Authorization Approval    Authorization Effective Date: 3/3/2022  Authorization Expiration Date: 3/1/2023  Medication: Palynziq 2.5mg-PA approved  Approved Dose/Quantity:   Reference #: Key: [id number] - PA Case ID: [id number]   Insurance Company: [employer] ([facility]) - Phone [phone] Fax [phone]  Expected CoPay:       CoPay Card Available:      Foundation Assistance Needed:    Which Pharmacy is filling the prescription (Not needed for infusion/clinic administered): [place], [place] - [address] [place] [address]  Pharmacy Notified: Yes  Patient Notified: Yes

## 2022-04-13 DIAGNOSIS — E70.1 PHENYLKETONURIA (PKU) (H): Primary | ICD-10-CM

## 2022-04-13 RX ORDER — PEGVALIASE-PQPZ 2.5 MG/.5ML
INJECTION, SOLUTION SUBCUTANEOUS
Qty: 2 ML | Refills: 0 | Status: SHIPPED | OUTPATIENT
Start: 2022-04-13 | End: 2022-08-12

## 2022-04-13 RX ORDER — PEGVALIASE-PQPZ 2.5 MG/.5ML
INJECTION, SOLUTION SUBCUTANEOUS
Qty: 1 ML | Refills: 0 | Status: SHIPPED | OUTPATIENT
Start: 2022-04-13 | End: 2022-08-12

## 2022-04-20 DIAGNOSIS — Z11.59 ENCOUNTER FOR SCREENING FOR OTHER VIRAL DISEASES: Primary | ICD-10-CM

## 2022-05-06 ENCOUNTER — INFUSION THERAPY VISIT (OUTPATIENT)
Dept: INFUSION THERAPY | Facility: CLINIC | Age: 27
End: 2022-05-06
Attending: PEDIATRICS
Payer: COMMERCIAL

## 2022-05-06 VITALS
RESPIRATION RATE: 14 BRPM | SYSTOLIC BLOOD PRESSURE: 119 MMHG | OXYGEN SATURATION: 96 % | TEMPERATURE: 98.1 F | HEART RATE: 69 BPM | DIASTOLIC BLOOD PRESSURE: 58 MMHG

## 2022-05-06 DIAGNOSIS — E70.1 PHENYLKETONURIA (PKU) (H): Primary | ICD-10-CM

## 2022-05-06 DIAGNOSIS — E70.1 PHENYLKETONURIA (PKU) (H): ICD-10-CM

## 2022-05-06 PROCEDURE — 96372 THER/PROPH/DIAG INJ SC/IM: CPT

## 2022-05-06 PROCEDURE — 36415 COLL VENOUS BLD VENIPUNCTURE: CPT

## 2022-05-06 PROCEDURE — 250N000013 HC RX MED GY IP 250 OP 250 PS 637

## 2022-05-06 PROCEDURE — 250N000012 HC RX MED GY IP 250 OP 636 PS 637

## 2022-05-06 RX ORDER — ALBUTEROL SULFATE 90 UG/1
1-2 AEROSOL, METERED RESPIRATORY (INHALATION)
Status: CANCELLED
Start: 2022-05-13

## 2022-05-06 RX ORDER — CETIRIZINE HYDROCHLORIDE 10 MG/1
10 TABLET ORAL ONCE
Status: CANCELLED
Start: 2022-05-13

## 2022-05-06 RX ORDER — DIPHENHYDRAMINE HCL 25 MG
50 CAPSULE ORAL ONCE
Status: COMPLETED | OUTPATIENT
Start: 2022-05-06 | End: 2022-05-06

## 2022-05-06 RX ORDER — EPINEPHRINE 1 MG/ML
0.3 INJECTION, SOLUTION, CONCENTRATE INTRAVENOUS EVERY 5 MIN PRN
Status: CANCELLED | OUTPATIENT
Start: 2022-05-13

## 2022-05-06 RX ORDER — DIPHENHYDRAMINE HYDROCHLORIDE 50 MG/ML
50 INJECTION INTRAMUSCULAR; INTRAVENOUS
Status: CANCELLED
Start: 2022-05-13

## 2022-05-06 RX ORDER — ALBUTEROL SULFATE 0.83 MG/ML
2.5 SOLUTION RESPIRATORY (INHALATION)
Status: CANCELLED | OUTPATIENT
Start: 2022-05-13

## 2022-05-06 RX ORDER — ACETAMINOPHEN 325 MG/1
650 TABLET ORAL ONCE
Status: CANCELLED
Start: 2022-05-13

## 2022-05-06 RX ORDER — DIPHENHYDRAMINE HCL 25 MG
50 CAPSULE ORAL ONCE
Status: CANCELLED
Start: 2022-05-13

## 2022-05-06 RX ORDER — FAMOTIDINE 40 MG/1
40 TABLET, FILM COATED ORAL ONCE
Status: CANCELLED
Start: 2022-05-13

## 2022-05-06 RX ORDER — METHYLPREDNISOLONE SODIUM SUCCINATE 125 MG/2ML
125 INJECTION, POWDER, LYOPHILIZED, FOR SOLUTION INTRAMUSCULAR; INTRAVENOUS
Status: CANCELLED
Start: 2022-05-13

## 2022-05-06 RX ORDER — MONTELUKAST SODIUM 10 MG/1
10 TABLET ORAL ONCE
Status: CANCELLED
Start: 2022-05-13

## 2022-05-06 RX ORDER — ACETAMINOPHEN 325 MG/1
650 TABLET ORAL ONCE
Status: COMPLETED | OUTPATIENT
Start: 2022-05-06 | End: 2022-05-06

## 2022-05-06 RX ADMIN — PEGVALIASE-PQPZ 2.5 MG: 2.5 INJECTION, SOLUTION SUBCUTANEOUS at 14:07

## 2022-05-06 RX ADMIN — DIPHENHYDRAMINE HYDROCHLORIDE 50 MG: 25 CAPSULE ORAL at 13:49

## 2022-05-06 RX ADMIN — ACETAMINOPHEN 650 MG: 325 TABLET ORAL at 13:49

## 2022-05-06 ASSESSMENT — PAIN SCALES - GENERAL: PAINLEVEL: NO PAIN (0)

## 2022-05-06 NOTE — PROGRESS NOTES
Infusion Nursing Note:  Kit Orona presents today for Palynziq.    Patient seen by provider today: No   present during visit today: Not Applicable.    Note: Pt arrived with DELORES who works as an RN.  Confirmed he has taken pre meds: Singulair, Zyrtec, and Pepcid.  Tylenol and Benadryl given.  Has his epi pen.      Instructed pt in injection.    Intravenous Access:  Labs drawn without difficulty.    Treatment Conditions:  See above.      Post Infusion Assessment:  Patient tolerated injection without incident.  Patient observed for 60 minutes post per protocol.       Discharge Plan:   Discharge instructions reviewed with: Patient.  Patient and/or family verbalized understanding of discharge instructions and all questions answered.  Patient discharged in stable condition accompanied by: girlfriend.      CHANTAL HAMMER RN

## 2022-05-11 ENCOUNTER — TELEPHONE (OUTPATIENT)
Dept: NUTRITION | Facility: CLINIC | Age: 27
End: 2022-05-11
Payer: COMMERCIAL

## 2022-05-11 LAB
PHE SERPL-MCNC: 15.8 MG/DL (ref 0.5–1.6)
PHE SERPL-SCNC: 95.6 UMOL/DL (ref 3–10)
TYROSINE SERPL-MCNC: 0.7 MG/DL (ref 0.6–2.4)
TYROSINE SERPL-SCNC: 3.7 UMOL/DL (ref 4–13)

## 2022-05-11 NOTE — PROGRESS NOTES
PHE result collected 5/6/22 given to patient - 15.8 mg/dL.    Per chart, patient received initial (1st dose) of Palynziq at Infusion Center visit May 6th.  2nd dose/injection set for visit on 5/13.

## 2022-05-13 ENCOUNTER — INFUSION THERAPY VISIT (OUTPATIENT)
Dept: INFUSION THERAPY | Facility: CLINIC | Age: 27
End: 2022-05-13
Attending: PEDIATRICS
Payer: COMMERCIAL

## 2022-05-13 VITALS
HEART RATE: 70 BPM | SYSTOLIC BLOOD PRESSURE: 123 MMHG | OXYGEN SATURATION: 99 % | RESPIRATION RATE: 16 BRPM | TEMPERATURE: 98.7 F | DIASTOLIC BLOOD PRESSURE: 53 MMHG

## 2022-05-13 DIAGNOSIS — E70.1 PHENYLKETONURIA (PKU) (H): ICD-10-CM

## 2022-05-13 DIAGNOSIS — E70.1 PHENYLKETONURIA (PKU) (H): Primary | ICD-10-CM

## 2022-05-13 PROCEDURE — 250N000012 HC RX MED GY IP 250 OP 636 PS 637

## 2022-05-13 PROCEDURE — 96372 THER/PROPH/DIAG INJ SC/IM: CPT

## 2022-05-13 PROCEDURE — 250N000013 HC RX MED GY IP 250 OP 250 PS 637

## 2022-05-13 RX ORDER — FAMOTIDINE 40 MG/1
40 TABLET, FILM COATED ORAL ONCE
Status: CANCELLED
Start: 2022-05-20

## 2022-05-13 RX ORDER — MONTELUKAST SODIUM 10 MG/1
10 TABLET ORAL ONCE
Status: COMPLETED | OUTPATIENT
Start: 2022-05-13 | End: 2022-05-13

## 2022-05-13 RX ORDER — CETIRIZINE HYDROCHLORIDE 10 MG/1
10 TABLET ORAL ONCE
Status: CANCELLED
Start: 2022-05-20

## 2022-05-13 RX ORDER — ACETAMINOPHEN 325 MG/1
650 TABLET ORAL ONCE
Status: CANCELLED
Start: 2022-05-20

## 2022-05-13 RX ORDER — DIPHENHYDRAMINE HCL 25 MG
50 CAPSULE ORAL ONCE
Status: COMPLETED | OUTPATIENT
Start: 2022-05-13 | End: 2022-05-13

## 2022-05-13 RX ORDER — METHYLPREDNISOLONE SODIUM SUCCINATE 125 MG/2ML
125 INJECTION, POWDER, LYOPHILIZED, FOR SOLUTION INTRAMUSCULAR; INTRAVENOUS
Status: CANCELLED
Start: 2022-05-20

## 2022-05-13 RX ORDER — EPINEPHRINE 1 MG/ML
0.3 INJECTION, SOLUTION, CONCENTRATE INTRAVENOUS EVERY 5 MIN PRN
Status: CANCELLED | OUTPATIENT
Start: 2022-05-20

## 2022-05-13 RX ORDER — ALBUTEROL SULFATE 0.83 MG/ML
2.5 SOLUTION RESPIRATORY (INHALATION)
Status: CANCELLED | OUTPATIENT
Start: 2022-05-20

## 2022-05-13 RX ORDER — DIPHENHYDRAMINE HYDROCHLORIDE 50 MG/ML
50 INJECTION INTRAMUSCULAR; INTRAVENOUS
Status: CANCELLED
Start: 2022-05-20

## 2022-05-13 RX ORDER — ACETAMINOPHEN 325 MG/1
650 TABLET ORAL ONCE
Status: COMPLETED | OUTPATIENT
Start: 2022-05-13 | End: 2022-05-13

## 2022-05-13 RX ORDER — MONTELUKAST SODIUM 10 MG/1
10 TABLET ORAL ONCE
Status: CANCELLED
Start: 2022-05-20

## 2022-05-13 RX ORDER — ALBUTEROL SULFATE 90 UG/1
1-2 AEROSOL, METERED RESPIRATORY (INHALATION)
Status: CANCELLED
Start: 2022-05-20

## 2022-05-13 RX ORDER — DIPHENHYDRAMINE HCL 25 MG
50 CAPSULE ORAL ONCE
Status: CANCELLED
Start: 2022-05-20

## 2022-05-13 RX ADMIN — DIPHENHYDRAMINE HYDROCHLORIDE 50 MG: 25 CAPSULE ORAL at 12:37

## 2022-05-13 RX ADMIN — ACETAMINOPHEN 650 MG: 325 TABLET ORAL at 12:38

## 2022-05-13 RX ADMIN — PEGVALIASE-PQPZ 2.5 MG: 2.5 INJECTION, SOLUTION SUBCUTANEOUS at 13:11

## 2022-05-13 RX ADMIN — MONTELUKAST 10 MG: 10 TABLET, FILM COATED ORAL at 12:38

## 2022-05-13 NOTE — PROGRESS NOTES
Infusion Nursing Note:  Kit Orona presents today for Palynziq injection #2.     Patient seen by provider today: No   present during visit today: Not Applicable.    Note: Patient presents with Significant Other, Krys (Girlfriend) and has his Epipen with him. Patient took Zyrtec, and Pepcid at home prior to visit. Patient given Tylenol, Singulair, and Benadryl premeds, 30 minutes prior to injection. Injection given in right thigh area. Patient given copies of Palynziq patient guide, wallet card, and Subcutaneous injection patient hand out. Patient's Significant Other, Krys is a Nurse. Patient and S.O. had no additional questions.       Intravenous Access:  No Intravenous access/labs at this visit.    Treatment Conditions:  Not Applicable.      Post Infusion Assessment:  Patient tolerated injection without incident.  Patient observed for 60 minutes post Palynziq per protocol.  Site patent and intact, free from redness, edema or discomfort.  No evidence of extravasations.       Discharge Plan:   Discharge instructions reviewed with: Patient.  Patient and/or family verbalized understanding of discharge instructions and all questions answered.  Patient discharged in stable condition accompanied by: self and Significant Other, Krys (Girlfriend).  Departure Mode: Ambulatory.      Breanna Castaneda RN

## 2022-05-16 ENCOUNTER — TELEPHONE (OUTPATIENT)
Dept: CONSULT | Facility: CLINIC | Age: 27
End: 2022-05-16
Payer: COMMERCIAL

## 2022-05-16 DIAGNOSIS — E70.1 PHENYLKETONURIA (PKU) (H): Primary | ICD-10-CM

## 2022-05-16 RX ORDER — METHYLPREDNISOLONE 4 MG
TABLET, DOSE PACK ORAL
Qty: 21 TABLET | Refills: 4 | Status: SHIPPED | OUTPATIENT
Start: 2022-05-16 | End: 2024-01-23

## 2022-05-16 NOTE — TELEPHONE ENCOUNTER
Pt called to say that since his last injection, he has had increasing side effects.  Has redness and soreness at injection site.  Called into work today as he is not feeling well.  Instructed him to call Medardo Monroe and let him know.

## 2022-08-12 ENCOUNTER — TELEPHONE (OUTPATIENT)
Dept: CONSULT | Facility: CLINIC | Age: 27
End: 2022-08-12

## 2022-08-12 DIAGNOSIS — E70.1 PHENYLKETONURIA (PKU) (H): Primary | ICD-10-CM

## 2022-08-12 RX ORDER — SAPROPTERIN DIHYDROCHLORIDE 100 MG/1
TABLET ORAL
Qty: 630 TABLET | Refills: 11 | Status: SHIPPED | OUTPATIENT
Start: 2022-08-12 | End: 2023-09-05

## 2022-08-12 NOTE — TELEPHONE ENCOUNTER
PA Initiation    Medication: Sapropterin--PA Initiated  Insurance Company: [employer] ([facility]) - Phone [phone] Fax [phone]  Pharmacy Filling the Rx:    Filling Pharmacy Phone:    Filling Pharmacy Fax:    Start Date: 8/12/2022    KEY: [id number]

## 2022-08-12 NOTE — TELEPHONE ENCOUNTER
Prior Authorization Approval    Authorization Effective Date: 8/12/2022  Authorization Expiration Date: 10/12/2022  Medication: Sapropterin--PA Approved  Approved Dose/Quantity: 630/30  Reference #: KEY: [id number]   Insurance Company: [employer] ([facility]) - Phone [phone] Fax [phone]  Expected CoPay:  $21,577.77  CoPay Card Available: Yes   Foundation Assistance Needed:  No  Which Pharmacy is filling the prescription (Not needed for infusion/clinic administered):    Pharmacy Notified: No    Patient Notified:  No    I ran a test claim and it is charging patient the full amount.  Called [phone] and spoke with represenative and was told they would put in a ticket ([id number]) and look into why they are not picking up any of the charges.  Ticket was put in as urgent and they will let us know and said to try and run later.

## 2022-08-15 NOTE — TELEPHONE ENCOUNTER
Called to check on the ticket that was placed to look into the extremely high co-pay and was told this was because it is a [employer] plan.  I placed call to Kit to discuss but no answer and his voicemail was full.  I have sent a MyChart also to discuss if this is his only insurance.  If this is only insurance we can also check with Hayden at [facility] about possibly getting free drug.

## 2022-08-22 NOTE — TELEPHONE ENCOUNTER
Spoke with pharmacy insurance and they are saying that Kit is enrolled in a prescription relief program since he had been laid off previously and this is inter fearing with the pharmacy claim.  This will need to be removed so that the pharmacy claim can be processed properly.  I have sent Kit a message to call me so I can get the phone number to his plan.

## 2022-08-23 NOTE — TELEPHONE ENCOUNTER
Called and spoke with [facility] and the reason we are getting the large co-pay is because this needs to be filled through [facility].  The co-pay through [facility] will be $3,832.95.  I have let Reji know this and have told him there is co-pay assistance that he can use to bring his co-pay down.

## 2022-10-30 ENCOUNTER — HEALTH MAINTENANCE LETTER (OUTPATIENT)
Age: 27
End: 2022-10-30

## 2023-01-31 ENCOUNTER — TELEPHONE (OUTPATIENT)
Dept: CONSULT | Facility: CLINIC | Age: 28
End: 2023-01-31
Payer: COMMERCIAL

## 2023-02-15 NOTE — PROGRESS NOTES
This is a recent snapshot of the patient's [facility] medical record.  For current drug dose and complete information and questions, call [phone] or In Basket pool, fv home infusion ([id number])  CSN Number:  [id number]    
hold due to low Hb  SCD
Baseline normal creatinine/BUN (1.12/11)  Likely due to hypotension and anemia   Back to baseline creatinine; BUN slightly elevated  resolved
hold due to low Hb  SCD
Held home meds due to bleeding.  Currently normotensive  Consider re-starting home antihypertensives post-ERCP or if prolonged hypertension
hold due to low Hb  SCD
Held home meds due to bleeding.  Currently normotensive  Consider re-starting home antihypertensives post-ERCP or if prolonged hypertension
hold due to low Hb  SCD

## 2023-09-05 ENCOUNTER — TELEPHONE (OUTPATIENT)
Dept: CONSULT | Facility: CLINIC | Age: 28
End: 2023-09-05
Payer: COMMERCIAL

## 2023-09-05 DIAGNOSIS — E70.1 PHENYLKETONURIA (PKU) (H): ICD-10-CM

## 2023-09-05 RX ORDER — SAPROPTERIN DIHYDROCHLORIDE 100 MG/1
TABLET ORAL
Qty: 630 TABLET | Refills: 3 | Status: SHIPPED | OUTPATIENT
Start: 2023-09-05 | End: 2024-01-23

## 2023-09-05 NOTE — TELEPHONE ENCOUNTER
PA Initiation    Medication: SAPROPTERIN DIHYDROCHLORIDE 100 MG PO TABS  Insurance Company: [employer] ([facility]) - Phone [phone] Fax [phone]  Pharmacy Filling the Rx:    Filling Pharmacy Phone:    Filling Pharmacy Fax:    Start Date: 9/5/2023  [id number]

## 2023-09-13 ENCOUNTER — TELEPHONE (OUTPATIENT)
Dept: CONSULT | Facility: CLINIC | Age: 28
End: 2023-09-13
Payer: COMMERCIAL

## 2023-09-13 NOTE — TELEPHONE ENCOUNTER
BINDU INITIATED    Medication: SAPROPTERIN DIHYDROCHLORIDE  [facility] Name: [facility]  Date submitted:   [facility] Phone:  [phone]  [facility] Fax: [phone]  Received fax from [facility] to verify physician information for copay assistance. Scanned semi-completed form into media to be completed.

## 2023-11-02 NOTE — TELEPHONE ENCOUNTER
CC:  CMC arthritis right thumb        HPI:  Kateryna Weber is a very pleasant 61 y.o. female with ongoing symptoms right thumb at the base related to gripping   She has pain at the base of the right thumb   No numbness or tingling reported   She did have left thumb CMC arthroplasty about 3 months ago with good results she is very pleased with the results on her left hand  She would like to proceed with surgery for the right thumb and hand         PAST MEDICAL HISTORY: No past medical history on file.  PAST SURGICAL HISTORY:   Past Surgical History:   Procedure Laterality Date    ABDOMINAL SURGERY  2000    tummy tuck    APPENDECTOMY      COLONOSCOPY N/A 4/8/2019    Procedure: COLONOSCOPY;  Surgeon: Ilya Arauz MD;  Location: [facility] ([address] [place] [address]);  Service: Endoscopy;  Laterality: N/A;  Pt requests Dr. Arauz - SHANTELW  Pt called to r/s due to being out of town, Pt did not want to wait until 5/2019 for next available appt.,Pt stated she would like to keep 4/8/19- ERW3/26/19@1432    GASTRIC BYPASS  2007    Why Weight Dr. Paul    HYSTERECTOMY      INTERPOSITION ARTHROPLASTY OF CARPOMETACARPAL JOINTS Left 8/15/2023    Procedure: INTERPOSITION ARTHROPLASTY, CMC JOINT;  Surgeon: Tomas Sy Jr., MD;  Location: [facility];  Service: Orthopedics;  Laterality: Left;  need arthrex tightrope cal notified cc     FAMILY HISTORY:   Family History   Problem Relation Age of Onset    Melanoma Neg Hx      SOCIAL HISTORY:   Social History     Socioeconomic History    Marital status:    Tobacco Use    Smoking status: Never    Smokeless tobacco: Never   Substance and Sexual Activity    Alcohol use: Yes     Comment: occ    Drug use: No    Sexual activity: Yes     Partners: Male     Birth control/protection: See Surgical Hx     Comment:      Social Determinants of Health     Financial Resource Strain: Unknown (3/19/2021)    Overall Financial Resource Strain (CARDIA)     Difficulty of Paying Living Expenses: Patient  Prior Authorization Approval    Medication: SAPROPTERIN DIHYDROCHLORIDE 100 MG PO TABS  Authorization Effective Date: 9/5/2023  Authorization Expiration Date: 9/5/2024  Approved Dose/Quantity: ud  Reference #: [id number]   Insurance Company: [employer] ([facility]) - Phone [phone] Fax [phone]  Expected CoPay:       CoPay Card Available:      Financial Assistance Needed:    Which Pharmacy is filling the prescription:  [facility]  Pharmacy Notified:    Patient Notified:  sent [facility] message     refused   Food Insecurity: Unknown (3/19/2021)    Hunger Vital Sign     Worried About Running Out of Food in the Last Year: Patient refused     Ran Out of Food in the Last Year: Patient refused   Transportation Needs: Unknown (3/19/2021)    PRAPARE - Transportation     Lack of Transportation (Medical): Patient refused     Lack of Transportation (Non-Medical): Patient refused   Physical Activity: Unknown (3/19/2021)    Exercise Vital Sign     Days of Exercise per Week: Patient refused   Stress: No Stress Concern Present (1/25/2021)    [employer] - Occupational Stress Questionnaire     Feeling of Stress : Only a little   Social Connections: Unknown (3/19/2021)    Social Connection and Isolation Panel [NHANES]     Frequency of Communication with Friends and Family: Patient refused     Frequency of Social Gatherings with Friends and Family: Patient refused     Active Member of Clubs or Organizations: Patient refused     Attends Club or Organization Meetings: Patient refused     Marital Status: Patient refused       MEDICATIONS:   Current Outpatient Medications:     buPROPion (WELLBUTRIN XL) 300 MG 24 hr tablet, Take 1 tablet (300 mg total) by mouth once daily., Disp: 30 tablet, Rfl: 11    estradioL (ESTRACE) 0.01 % (0.1 mg/gram) vaginal cream, Place 1 g vaginally once daily. Place 1 g vaginally every night for two weeks then twice per week, Disp: 42.5 g, Rfl: 6    flu vacc [id number] 6mos up,PF, (FLUARIX QUAD [id number], PF,) 60 mcg (15 mcg x 4)/0.5 mL Syrg, given by [facility], Disp: 0.5 mL, Rfl: 0    ibuprofen (ADVIL,MOTRIN) 600 MG tablet, TAKE 1 TABLET(600 MG) BY MOUTH TWICE DAILY WITH MEALS, Disp: 60 tablet, Rfl: 1    multivitamin capsule, Take 1 capsule by mouth once daily., Disp: , Rfl:     phentermine (ADIPEX-P) 37.5 mg tablet, Take 1 tablet (37.5 mg total) by mouth before breakfast., Disp: 30 tablet, Rfl: 3  No current facility-administered medications for this  "visit.    Facility-Administered Medications Ordered in Other Visits:     lactated ringers infusion, , Intravenous, Continuous, Tamara Pantoja DNP    LIDOcaine (PF) 10 mg/ml (1%) injection 10 mg, 1 mL, Intradermal, Once, Tamara Pantoja DNP  ALLERGIES:   Review of patient's allergies indicates:   Allergen Reactions    No known drug allergies        Review of Systems:  Constitutional: no fever or chills  ENT: no nasal congestion or sore throat  Respiratory: no cough or shortness of breath  Cardiovascular: no chest pain or palpitations  Gastrointestinal: no nausea or vomiting, PUD, GERD, NSAID intolerance  Genitourinary: no hematuria or dysuria  Integument/Breast: no rash or pruritis  Hematologic/Lymphatic: no easy bruising or lymphadenopathy  Musculoskeletal: see HPI  Neurological: no seizures or tremors  Behavioral/Psych: no auditory or visual hallucinations      Physical Exam   Vitals:    11/02/23 1017   Height: 5' 2" (1.575 m)   PainSc: 0-No pain   PainLoc: Hand       Constitutional: Oriented to person, place, and time. Appears well-developed and well-nourished.   HENT:   Head: Normocephalic and atraumatic.   Nose: Nose normal.   Eyes: No scleral icterus.   Neck: Normal range of motion.   Cardiovascular: Normal rate and regular rhythm.    Pulses:       Radial pulses are 2+ on the right side, and 2+ on the left side.   Pulmonary/Chest: Effort normal and breath sounds normal.   Abdominal: Soft.   Neurological: Alert and oriented to person, place, and time.   Skin: Skin is warm.   Psychiatric: Normal mood and affect.     MUSCULOSKELETAL UPPER EXTREMITY:  Examination right hand there is tenderness at the base of the right thumb bony enlargement is noted positive grind test mild crepitation   strength decreased   Tinel sign negative               Diagnostic Studies:  AP lateral x-ray right hand show severe arthritic change CMC joint right thumb        Assessment:  CMC arthritis right thumb severe    Plan:  She " "would like to set up surgery outpatient for CMC arthroplasty right thumb      The risks and benefits of surgery were discussed with the patient today and they understand.  The consent was signed in the office for surgery.      Tomas Sy MD (Jay)  [facility]  Orthopedic Upper Extremity Surgery       "

## 2023-11-12 ENCOUNTER — HEALTH MAINTENANCE LETTER (OUTPATIENT)
Age: 28
End: 2023-11-12

## 2024-01-23 ENCOUNTER — VIRTUAL VISIT (OUTPATIENT)
Dept: PEDIATRICS | Facility: CLINIC | Age: 29
End: 2024-01-23
Attending: DIETITIAN, REGISTERED
Payer: COMMERCIAL

## 2024-01-23 ENCOUNTER — VIRTUAL VISIT (OUTPATIENT)
Dept: PEDIATRICS | Facility: CLINIC | Age: 29
End: 2024-01-23
Payer: COMMERCIAL

## 2024-01-23 VITALS — WEIGHT: 275 LBS | BODY MASS INDEX: 35.31 KG/M2

## 2024-01-23 DIAGNOSIS — E70.1 PHENYLKETONURIA (PKU) (H): ICD-10-CM

## 2024-01-23 PROCEDURE — 99213 OFFICE O/P EST LOW 20 MIN: CPT | Mod: 95 | Performed by: PEDIATRICS

## 2024-01-23 RX ORDER — SAPROPTERIN DIHYDROCHLORIDE 100 MG/1
TABLET ORAL
Qty: 750 TABLET | Refills: 11 | Status: SHIPPED | OUTPATIENT
Start: 2024-01-23 | End: 2024-08-21

## 2024-01-23 NOTE — LETTER
1/23/2024      RE: Kit Orona  [address] [facility] [address]     Dear Colleague,    Thank you for the opportunity to participate in the care of your patient, Kit Orona, at the [facility] at [facility]. Please see a copy of my visit note below.    ..CLINICAL NUTRITION SERVICES - ASSESSMENT NOTE    REASON FOR ASSESSMENT  Kit Orona is a 28 year old male seen by the dietitian in PKU clinic for PKU treated with Kuvan.     RECOMMENDATIONS    Send in blood PHE level to obtain recent control  Trial PKU formula samples and inquire to insurance coverage    To schedule future appointment call [phone].        ANTHROPOMETRICS  Height: 188 cm or 74 in  Weight: 124.7 kg or 275 lbs  BMI: 35.4    NUTRITION HISTORY  Kit is eating a regular intake and currently not restricting any certain foods.  He takes Kuvan for his PKU management.      Typical oral intakes:  Not taken this visit    Special considerations:  Nutrition related medical updates:  patient has questions about role of formula and how taking PKU formula may benefit him.  He is willing to try new options (last taken per chart was PKU Maxamum)    Other:  Social: patient is  and is currently expecting twins with wife    Home Regimen: PKU formula - none currently     NUTRITION RELATED PHYSICAL FINDINGS  None    NUTRITION RELATED LABS  Labs reviewed    PHE TYR  5/2022  15.8 0.7  2/2022  17 1.7    NUTRITION RELATED MEDICATIONS  Medications reviewed  -Kuvan daily    ESTIMATED NUTRITION NEEDS:  Energy Needs: [place]-St. Jeor (7130) x 1.1-1.2 = 20-22 kcal/kg  Protein Needs: DRI/age: 0.8 g/kg  Estimated PHE Needs: undetermined  Micronutrient Needs: RDA for age     NUTRITION DIAGNOSIS  Impaired nutrient utilization related to diagnosis of PKU as evidenced by elevated blood PHE levels    INTERVENTIONS  Nutrition Prescription  Kit to meet 100%  estimated needs on low protein diet.    Nutrition Education:   Provided education on ongoing nutritional plan of care.    Discussed use of PKU formula and how role in diet helps impact lowering blood PHE levels, building muscle and improving weight management with role of satiety and providing non-offending amino acids contributing to protein load for improved nutritional adequacy.  Many patient also feel better able to limit their protein intake by limiting highest protein food items in portion size and frequency utilizing use of formula.  Patient in agreement to do insurance benefit inquiry on coverage of PKU formula and trial some newer options that may be improved in palatability or volume.  Home blood testing kits requested from lab.  Encouraged sending a recent/current level to determine current status of PKU management on Kuvan therapy alone.    Implementation:  Implementation: Collaboration with other providers - discussed with PKU team.    Goals  BMI maintenance (minimum) or decrease in BMI  Meet >85% estimated nutrition needs through low protein diet  PHE levels 2-6 mg/dL    FOLLOW UP/MONITORING  Energy Intake  Macronutrient intake  Anthropometric measurements     Spent 15 minutes in consult with Kit Nunn RD, LD      Please do not hesitate to contact me if you have any questions/concerns.     Sincerely,       Alysia Nunn RD

## 2024-01-23 NOTE — PROGRESS NOTES
Kit is a 28 year old who is being evaluated via a billable video visit.      How would you like to obtain your AVS? MyChart  If the video visit is dropped, the invitation should be resent by: Send to e-mail at: [email or web address]  Will anyone else be joining your video visit? Dr. Aliya Perla, PharmD. (Pharmacist) and REINA Cisneros. (Research Physician)        Video-Visit Details    Type of service:  Video Visit   Visit Start Time: 8:31AM  Visit End Time: 8:57AM    Originating Location (pt. Location): Home  Distant Location (provider location):  Off-site  Platform used for Video Visit: [facility]              Phenylketonuria (PKU) Clinic  [address] [address]  Phone: [phone]  Fax: [phone]  Date: 2024      Patient:  Kit Orona   :   1995   MRN:     [id number]      Kit Orona  [address] [facility] [address]    Dear  Physician No Ref-Primary and Parents of Kit Orona,    CHIEF COMPLAINT:     I had the pleasure of seeing Kit Orona in the PKU and Maternal PKU Clinic at the [facility] regarding phenylketonuria (PKU). Patient is here for evaluation and treatment.      Since the last visit, there have been no hospitalizations, emergency department visits, or other major changes in medical care.    Kit is a 28 year old male with PKU, last seen in clinic in . He is currently taking the generic Kuvan formulation, 2100mg by mouth daily. He denies side effects. He had held Kuvan therapy in 3745-3113 due to financial concerns with significantly high co-pays. He reports he has been back on Kuvan since 2023 with financial assistance from [employer].    He has tried Palynziq in the past -- discontinued in 2022 after injection site reactions and flu-like symptoms.     He is interested in adding formula to work on lowering his phenylalanine levels.    Reji currently works for  [employer] in a remote position and reports it is going well.     PAST MEDICAL HISTORY:    These list of past medical problems includes:    Patient Active Problem List   Diagnosis    Phenylketonuria (PKU) - Kuvan responsive       FAMILY HISTORY: A brief family medical history was reviewed.  MEDICATIONS:   Current Outpatient Medications   Medication Sig    Amino Acids (XPHE MAXAMUM) POWD Take 200 g by mouth daily    cetirizine (ZYRTEC) 10 MG tablet Take 1 tablet (10 mg) by mouth daily    EPINEPHrine (ANY BX GENERIC EQUIV) 0.3 MG/0.3ML injection 2-pack Inject 0.3 mg intramuscularly as needed for anaphylaxis    sapropterin dihydrochloride (KUVAN) 100 MG tablet Take 2100 mg (21 tablets) by mouth everyday with food.    famotidine (PEPCID) 10 MG tablet Take 1 tablet (10 mg) by mouth 2 times daily (Patient not taking: Reported on 1/23/2024)    methylPREDNISolone (MEDROL DOSEPAK) 4 MG tablet therapy pack Follow Package Directions (Patient not taking: Reported on 1/23/2024)     No current facility-administered medications for this visit.     REVIEW OF SYSTEMS: The review of systems negative for new eye, ear, heart, lung, liver, spleen, gastrointestinal, bone, muscle, integumentary, endocrinologic, brain or psychiatric issues except as noted above.  PHYSICAL EXAMINATION:  vITAL SIGNS/WEIGHT:   Wt Readings from Last 2 Encounters:   03/01/22 119.3 kg (263 lb)   05/14/19 122 kg (269 lb)        PHYSICAL EXAMINATION:   General: The patient is oriented to person, place and time at an age-appropriate manner.   HEENT: The facial features are normal and symmetric. The ears are of normal position and configuration and hearing is grossly normal.  Neck: The neck appears to have full range of motion  Chest: Does not appear to be tachypneic or in any respiratory distress.  Heart:  Kit Orona  appears well perfused.  Abdomen: Not examined.  Extremities: The extremities are of normal configuration without visible  "contractures.  Back: Not examined.   Integument: The visible part of the integument is of normal appearance without significant changes in pigmentation, birthmarks, or lesions.  Neuromuscular:  Mental Status Exam: Alert, awake. Fully oriented. No dysarthria, no dysphasia. Speech of normal fluency.  Appears to have normal strength and full range of motion of the extremities.      LABORATORY RESULTS: Previous studies showed pathologically elevated blood phenylalanine levels as well as specific PAH gene mutations and excluded disorders of biopterin recycling. Laboratory studies from the past year were reviewed.   Latest Reference Range & Units 07/31/19 12:00 02/21/22 20:11 05/06/22 13:57   Phenylalanine mg/dl 0.5 - 1.6 mg/dL 16.0 (H) 17.0 (H) 15.8 (H)   (H): Data is abnormally high    ASSESSMENT:  Phenylketonuria disease (PKU)  On phenylalanine restricted diet  On sapropterin dihydrochloride 2100mg by mouth daily, current weight: 275kg  History of Palynziq use, discontinued due to immune response in August 2022  Last phenylalanine level is from 2022. Elevated phenylalanine (>15 mgldL)  blood levels, aiming at optimal levels of 6 mg/dL or lower  Would benefit from optimizing therapy to achieve goal phenylalanine levels  Interested in starting PKU formula    PLAN/RECOMMENDATIONS:  Continue low phenylalanine diet.  Increase dose of sapropterin dihydrochloride to 2500mg (25 tablets) by mouth daily based on current weight  Send fasting blood \"tyrosine and phenylalanine\" levels monthly  Metabolic PKU Dietician consult today to discuss starting PKU formula  Clinical Pharmacotherapy consultation with Aliya Perla PharmD  Return to PKU Clinic in 12 months.     FOLLOW-UP PLAN:  If you are returning to clinic to review specific laboratory tests, please call the Genetic Counselor (see phone numbers below) to confirm that we have received all of the results from reference laboratories prior to your appointment. If we have not " received all of the test results, please discuss re-scheduling your appointment.      With warmest regards,     Zach Richardson PhD MD  Medical Director, PKU Clinic  Professor of Pediatrics, Medical School, and  Experimental and Clinical Pharmacology, [employer]    Monday mornings: [facility]  Tuesday mornings : [facility] PKU [facility]    Pharmacotherapy Consultant:  Aliya Perla, PharmD, Pharmacotherapy for Metabolic Disorders (PIMD): [phone]    Genetic Counselor:  Yael Santos MS, [facility] (Genetic test Results): [phone]    Metabolic Dietician:  Alysia Nunn, Registered Dietician: [phone]    [facility] Clinic Scheduler:  Lianet Kapoor, [phone]    Nurse Coordinator, PKU, Metabolism and Genetics:  Miriam Mason RNCC, [phone]    Research Associate  REINA Chu, CCRP, [phone]    Copies:      Kit Orona  [address] [place] [id number]

## 2024-01-23 NOTE — LETTER
2024      RE: Kit Orona  [address] [facility] [address]     Dear Colleague,    Thank you for the opportunity to participate in the care of your patient, Kit Orona, at the [facility] at [facility]. Please see a copy of my visit note below.    Kit is a 28 year old who is being evaluated via a billable video visit.      How would you like to obtain your AVS? MyChart  If the video visit is dropped, the invitation should be resent by: Send to e-mail at: [email or web address]  Will anyone else be joining your video visit? Dr. Aliya Perla, PharmD. (Pharmacist) and REINA Cisneros. (Research Physician)        Video-Visit Details    Type of service:  Video Visit   Visit Start Time: 8:31AM  Visit End Time: 8:57AM    Originating Location (pt. Location): Home  Distant Location (provider location):  Off-site  Platform used for Video Visit: [facility]              Phenylketonuria (PKU) Clinic  [address] [address]  Phone: [phone]  Fax: [phone]  Date: 2024      Patient:  Kit Orona   :   1995   MRN:     [id number]      Kit Orona  [address] [facility] [address]    Dear Dr. Physician No Ref-Primary and Parents of Kit Orona,    CHIEF COMPLAINT:     I had the pleasure of seeing Kit Orona in the PKU and Maternal PKU Clinic at the [facility] regarding phenylketonuria (PKU). Patient is here for evaluation and treatment.      Since the last visit, there have been no hospitalizations, emergency department visits, or other major changes in medical care.    Kit is a 28 year old male with PKU, last seen in clinic in . He is currently taking the generic Kuvan formulation, 2100mg by mouth daily. He denies side effects. He had held Kuvan therapy in 7040-5346 due to financial concerns with  significantly high co-pays. He reports he has been back on Kuvan since October 2023 with financial assistance from [employer].    He has tried Palynziq in the past -- discontinued in August 2022 after injection site reactions and flu-like symptoms.     He is interested in adding formula to work on lowering his phenylalanine levels.    Reji currently works for [employer] in a remote position and reports it is going well.     PAST MEDICAL HISTORY:    These list of past medical problems includes:    Patient Active Problem List   Diagnosis     Phenylketonuria (PKU) - Kuvan responsive       FAMILY HISTORY: A brief family medical history was reviewed.  MEDICATIONS:   Current Outpatient Medications   Medication Sig     Amino Acids (XPHE MAXAMUM) POWD Take 200 g by mouth daily     cetirizine (ZYRTEC) 10 MG tablet Take 1 tablet (10 mg) by mouth daily     EPINEPHrine (ANY BX GENERIC EQUIV) 0.3 MG/0.3ML injection 2-pack Inject 0.3 mg intramuscularly as needed for anaphylaxis     sapropterin dihydrochloride (KUVAN) 100 MG tablet Take 2100 mg (21 tablets) by mouth everyday with food.     famotidine (PEPCID) 10 MG tablet Take 1 tablet (10 mg) by mouth 2 times daily (Patient not taking: Reported on 1/23/2024)     methylPREDNISolone (MEDROL DOSEPAK) 4 MG tablet therapy pack Follow Package Directions (Patient not taking: Reported on 1/23/2024)     No current facility-administered medications for this visit.     REVIEW OF SYSTEMS: The review of systems negative for new eye, ear, heart, lung, liver, spleen, gastrointestinal, bone, muscle, integumentary, endocrinologic, brain or psychiatric issues except as noted above.  PHYSICAL EXAMINATION:  vITAL SIGNS/WEIGHT:   Wt Readings from Last 2 Encounters:   03/01/22 119.3 kg (263 lb)   05/14/19 122 kg (269 lb)        PHYSICAL EXAMINATION:   General: The patient is oriented to person, place and time at an age-appropriate manner.   HEENT: The facial features are normal and symmetric. The ears  "are of normal position and configuration and hearing is grossly normal.  Neck: The neck appears to have full range of motion  Chest: Does not appear to be tachypneic or in any respiratory distress.  Heart:  Kit Orona  appears well perfused.  Abdomen: Not examined.  Extremities: The extremities are of normal configuration without visible contractures.  Back: Not examined.   Integument: The visible part of the integument is of normal appearance without significant changes in pigmentation, birthmarks, or lesions.  Neuromuscular:  Mental Status Exam: Alert, awake. Fully oriented. No dysarthria, no dysphasia. Speech of normal fluency.  Appears to have normal strength and full range of motion of the extremities.      LABORATORY RESULTS: Previous studies showed pathologically elevated blood phenylalanine levels as well as specific PAH gene mutations and excluded disorders of biopterin recycling. Laboratory studies from the past year were reviewed.   Latest Reference Range & Units 07/31/19 12:00 02/21/22 20:11 05/06/22 13:57   Phenylalanine mg/dl 0.5 - 1.6 mg/dL 16.0 (H) 17.0 (H) 15.8 (H)   (H): Data is abnormally high    ASSESSMENT:  Phenylketonuria disease (PKU)  On phenylalanine restricted diet  On sapropterin dihydrochloride 2100mg by mouth daily, current weight: 275kg  History of Palynziq use, discontinued due to immune response in August 2022  Last phenylalanine level is from 2022. Elevated phenylalanine (>15 mgldL)  blood levels, aiming at optimal levels of 6 mg/dL or lower  Would benefit from optimizing therapy to achieve goal phenylalanine levels  Interested in starting PKU formula    PLAN/RECOMMENDATIONS:  Continue low phenylalanine diet.  Increase dose of sapropterin dihydrochloride to 2500mg (25 tablets) by mouth daily based on current weight  Send fasting blood \"tyrosine and phenylalanine\" levels monthly  Metabolic PKU Dietician consult today to discuss starting PKU formula  Clinical Pharmacotherapy " consultation with Aliya Perla PharmD  Return to PKU Clinic in 12 months.     FOLLOW-UP PLAN:  If you are returning to clinic to review specific laboratory tests, please call the Genetic Counselor (see phone numbers below) to confirm that we have received all of the results from reference laboratories prior to your appointment. If we have not received all of the test results, please discuss re-scheduling your appointment.      With warmest regards,     Zach Richardson PhD, MD  Medical Director, PKU Clinic  Professor of Pediatrics, Medical School, and  Experimental and Clinical Pharmacology, [employer]    Monday mornings: [facility]  Tuesday mornings : [facility] PKU clinic    Pharmacotherapy Consultant:  Aliya Perla PharmD, Pharmacotherapy for Metabolic Disorders (PIMD): [phone]    Genetic Counselor:  Yael Santos MS, [facility] (Genetic test Results): [phone]    Metabolic Dietician:  Alysia Nunn, Registered Dietician: [phone]    [facility] Clinic Scheduler:  Lianet Kapoor, [phone]    Nurse Coordinator, PKU, Metabolism and Genetics:  Miriam Mason RNCC, [phone]    Research Associate  REINA Chu, CCRP, [phone]    Copies:      Kit Orona  [address] [facility] [address]      Please do not hesitate to contact me if you have any questions/concerns.     Sincerely,       Nito Richardson MD

## 2024-02-07 NOTE — PROGRESS NOTES
..CLINICAL NUTRITION SERVICES - ASSESSMENT NOTE    REASON FOR ASSESSMENT  Kit Orona is a 28 year old male seen by the dietitian in PKU clinic for PKU treated with Kuvan.     RECOMMENDATIONS    Send in blood PHE level to obtain recent control  Trial PKU formula samples and inquire to insurance coverage    To schedule future appointment call [phone].        ANTHROPOMETRICS  Height: 188 cm or 74 in  Weight: 124.7 kg or 275 lbs  BMI: 35.4    NUTRITION HISTORY  Kit is eating a regular intake and currently not restricting any certain foods.  He takes Kuvan for his PKU management.      Typical oral intakes:  Not taken this visit    Special considerations:  Nutrition related medical updates:  patient has questions about role of formula and how taking PKU formula may benefit him.  He is willing to try new options (last taken per chart was PKU Maxamum)    Other:  Social: patient is  and is currently expecting twins with wife    Home Regimen: PKU formula - none currently     NUTRITION RELATED PHYSICAL FINDINGS  None    NUTRITION RELATED LABS  Labs reviewed    PHE TYR  5/2022  15.8 0.7  2/2022  17 1.7    NUTRITION RELATED MEDICATIONS  Medications reviewed  -Kuvan daily    ESTIMATED NUTRITION NEEDS:  Energy Needs: [place]-St. Jeor (7865) x 1.1-1.2 = 20-22 kcal/kg  Protein Needs: DRI/age: 0.8 g/kg  Estimated PHE Needs: undetermined  Micronutrient Needs: RDA for age     NUTRITION DIAGNOSIS  Impaired nutrient utilization related to diagnosis of PKU as evidenced by elevated blood PHE levels    INTERVENTIONS  Nutrition Prescription  Kit to meet 100% estimated needs on low protein diet.    Nutrition Education:   Provided education on ongoing nutritional plan of care.    Discussed use of PKU formula and how role in diet helps impact lowering blood PHE levels, building muscle and improving weight management with role of satiety and providing non-offending amino acids contributing to protein load for improved  nutritional adequacy.  Many patient also feel better able to limit their protein intake by limiting highest protein food items in portion size and frequency utilizing use of formula.  Patient in agreement to do insurance benefit inquiry on coverage of PKU formula and trial some newer options that may be improved in palatability or volume.  Home blood testing kits requested from lab.  Encouraged sending a recent/current level to determine current status of PKU management on Kuvan therapy alone.    Implementation:  Implementation: Collaboration with other providers - discussed with PKU team.    Goals  BMI maintenance (minimum) or decrease in BMI  Meet >85% estimated nutrition needs through low protein diet  PHE levels 2-6 mg/dL    FOLLOW UP/MONITORING  Energy Intake  Macronutrient intake  Anthropometric measurements     Spent 15 minutes in consult with Kit Nunn, AN, LD

## 2024-02-08 ENCOUNTER — HOME INFUSION (PRE-WILLOW HOME INFUSION) (OUTPATIENT)
Dept: PHARMACY | Facility: CLINIC | Age: 29
End: 2024-02-08
Payer: COMMERCIAL

## 2024-02-08 NOTE — PROGRESS NOTES
Therapy: XPHE Max Powder  Insurance: [facility]  Ded: $5200  Met: $325    Co-Insurance: 80/20  Max Out of Pocket: $[id number]  Met: $3016    Misc: At this time there is no coverage for XPHE Max Powder. Pt would need to agree to self pay for coverage    In reference to referral received on 02/07/24 from [facility] to check for XPHE coverage.     Please contact Intake with any questions, [phone] or In Basket pool, [facility] ([phone]).

## 2024-02-10 ENCOUNTER — LAB (OUTPATIENT)
Dept: LAB | Facility: CLINIC | Age: 29
End: 2024-02-10

## 2024-02-10 DIAGNOSIS — E70.1 PHENYLKETONURIA (PKU) (H): ICD-10-CM

## 2024-02-15 LAB
PHE SERPL-MCNC: 13.5 MG/DL (ref 0.5–1.6)
PHE SERPL-SCNC: 81.9 UMOL/DL (ref 3–10)
TYROSINE SERPL-MCNC: 1.6 MG/DL (ref 0.6–2.4)
TYROSINE SERPL-SCNC: 8.7 UMOL/DL (ref 4–13)

## 2024-02-28 ENCOUNTER — TELEPHONE (OUTPATIENT)
Dept: CONSULT | Facility: CLINIC | Age: 29
End: 2024-02-28

## 2024-02-28 NOTE — TELEPHONE ENCOUNTER
BINDU APPROVED    Medication: SAPROPTERIN DIHYDROCHLORIDE 100 MG PO TABS  Amount: $    Foundation Name: [employer]  Foundation Phone: [phone]  [facility] Fax: [phone]  Member ID:   BIN:   ALECN:   Group:   Foundation Effective Date: 2/1/2024  Foundation Expiration Date: 12/31/2024  Additional Information:   Patient Notified: Yes    Tried contacting [facility] and they would not give out information, the patient has to call. Spoke with colleagues who have used [facility] before stating it is reimbursement only. Patient must pay co-pay up front, then reimbursed by [facility] directly. Patient can call [facility] to find out more.

## 2024-04-03 ENCOUNTER — APPOINTMENT (OUTPATIENT)
Dept: RADIOLOGY | Facility: CLINIC | Age: 29
End: 2024-04-03
Attending: EMERGENCY MEDICINE
Payer: COMMERCIAL

## 2024-04-03 ENCOUNTER — HOSPITAL ENCOUNTER (EMERGENCY)
Facility: CLINIC | Age: 29
Discharge: HOME | End: 2024-04-03
Attending: EMERGENCY MEDICINE | Admitting: EMERGENCY MEDICINE
Payer: COMMERCIAL

## 2024-04-03 VITALS
RESPIRATION RATE: 16 BRPM | OXYGEN SATURATION: 96 % | HEART RATE: 90 BPM | TEMPERATURE: 97.6 F | DIASTOLIC BLOOD PRESSURE: 71 MMHG | SYSTOLIC BLOOD PRESSURE: 116 MMHG

## 2024-04-03 DIAGNOSIS — I48.91 ATRIAL FIBRILLATION WITH RAPID VENTRICULAR RESPONSE (H): ICD-10-CM

## 2024-04-03 LAB
ALBUMIN SERPL BCG-MCNC: 4.5 G/DL (ref 3.5–5.2)
ALP SERPL-CCNC: 106 U/L (ref 40–150)
ALT SERPL W P-5'-P-CCNC: 51 U/L (ref 0–70)
ANION GAP SERPL CALCULATED.3IONS-SCNC: 10 MMOL/L (ref 7–15)
AST SERPL W P-5'-P-CCNC: 40 U/L (ref 0–45)
BASOPHILS # BLD AUTO: 0.1 10E3/UL (ref 0–0.2)
BASOPHILS NFR BLD AUTO: 1 %
BILIRUB DIRECT SERPL-MCNC: NORMAL MG/DL
BILIRUB SERPL-MCNC: 0.2 MG/DL
BUN SERPL-MCNC: 8.3 MG/DL (ref 6–20)
CALCIUM SERPL-MCNC: 10.1 MG/DL (ref 8.6–10)
CHLORIDE SERPL-SCNC: 111 MMOL/L (ref 98–107)
CREAT SERPL-MCNC: 0.95 MG/DL (ref 0.67–1.17)
DEPRECATED HCO3 PLAS-SCNC: 23 MMOL/L (ref 22–29)
EGFRCR SERPLBLD CKD-EPI 2021: >90 ML/MIN/1.73M2
EOSINOPHIL # BLD AUTO: 0.2 10E3/UL (ref 0–0.7)
EOSINOPHIL NFR BLD AUTO: 2 %
ERYTHROCYTE [DISTWIDTH] IN BLOOD BY AUTOMATED COUNT: 13.3 % (ref 10–15)
GLUCOSE SERPL-MCNC: 90 MG/DL (ref 70–99)
HCT VFR BLD AUTO: 45.7 % (ref 40–53)
HGB BLD-MCNC: 15.3 G/DL (ref 13.3–17.7)
HOLD SPECIMEN: NORMAL
HOLD SPECIMEN: NORMAL
IMM GRANULOCYTES # BLD: 0 10E3/UL
IMM GRANULOCYTES NFR BLD: 0 %
LYMPHOCYTES # BLD AUTO: 2.3 10E3/UL (ref 0.8–5.3)
LYMPHOCYTES NFR BLD AUTO: 22 %
MAGNESIUM SERPL-MCNC: 2.2 MG/DL (ref 1.7–2.3)
MCH RBC QN AUTO: 27.7 PG (ref 26.5–33)
MCHC RBC AUTO-ENTMCNC: 33.5 G/DL (ref 31.5–36.5)
MCV RBC AUTO: 83 FL (ref 78–100)
MONOCYTES # BLD AUTO: 0.8 10E3/UL (ref 0–1.3)
MONOCYTES NFR BLD AUTO: 8 %
NEUTROPHILS # BLD AUTO: 6.9 10E3/UL (ref 1.6–8.3)
NEUTROPHILS NFR BLD AUTO: 67 %
NRBC # BLD AUTO: 0 10E3/UL
NRBC BLD AUTO-RTO: 0 /100
PLATELET # BLD AUTO: 336 10E3/UL (ref 150–450)
POTASSIUM SERPL-SCNC: 4.7 MMOL/L (ref 3.4–5.3)
PROT SERPL-MCNC: 7.2 G/DL (ref 6.4–8.3)
RBC # BLD AUTO: 5.52 10E6/UL (ref 4.4–5.9)
SODIUM SERPL-SCNC: 144 MMOL/L (ref 135–145)
TSH SERPL DL<=0.005 MIU/L-ACNC: 2.86 UIU/ML (ref 0.3–4.2)
WBC # BLD AUTO: 10.2 10E3/UL (ref 4–11)

## 2024-04-03 PROCEDURE — 84155 ASSAY OF PROTEIN SERUM: CPT | Performed by: EMERGENCY MEDICINE

## 2024-04-03 PROCEDURE — 250N000009 HC RX 250: Performed by: EMERGENCY MEDICINE

## 2024-04-03 PROCEDURE — 36415 COLL VENOUS BLD VENIPUNCTURE: CPT | Performed by: EMERGENCY MEDICINE

## 2024-04-03 PROCEDURE — 999N000157 HC STATISTIC RCP TIME EA 10 MIN

## 2024-04-03 PROCEDURE — 258N000003 HC RX IP 258 OP 636: Performed by: EMERGENCY MEDICINE

## 2024-04-03 PROCEDURE — 71045 X-RAY EXAM CHEST 1 VIEW: CPT

## 2024-04-03 PROCEDURE — 92960 CARDIOVERSION ELECTRIC EXT: CPT

## 2024-04-03 PROCEDURE — 96374 THER/PROPH/DIAG INJ IV PUSH: CPT

## 2024-04-03 PROCEDURE — 93005 ELECTROCARDIOGRAM TRACING: CPT | Mod: XU | Performed by: EMERGENCY MEDICINE

## 2024-04-03 PROCEDURE — 82310 ASSAY OF CALCIUM: CPT | Performed by: EMERGENCY MEDICINE

## 2024-04-03 PROCEDURE — 96375 TX/PRO/DX INJ NEW DRUG ADDON: CPT | Mod: 59

## 2024-04-03 PROCEDURE — 84443 ASSAY THYROID STIM HORMONE: CPT | Performed by: EMERGENCY MEDICINE

## 2024-04-03 PROCEDURE — 84450 TRANSFERASE (AST) (SGOT): CPT | Performed by: EMERGENCY MEDICINE

## 2024-04-03 PROCEDURE — 93005 ELECTROCARDIOGRAM TRACING: CPT | Performed by: EMERGENCY MEDICINE

## 2024-04-03 PROCEDURE — 85025 COMPLETE CBC W/AUTO DIFF WBC: CPT | Performed by: EMERGENCY MEDICINE

## 2024-04-03 PROCEDURE — 99291 CRITICAL CARE FIRST HOUR: CPT | Mod: 25

## 2024-04-03 PROCEDURE — 96361 HYDRATE IV INFUSION ADD-ON: CPT | Mod: 59

## 2024-04-03 PROCEDURE — 250N000013 HC RX MED GY IP 250 OP 250 PS 637: Performed by: EMERGENCY MEDICINE

## 2024-04-03 PROCEDURE — 250N000011 HC RX IP 250 OP 636: Performed by: EMERGENCY MEDICINE

## 2024-04-03 PROCEDURE — 99152 MOD SED SAME PHYS/QHP 5/>YRS: CPT

## 2024-04-03 PROCEDURE — 83735 ASSAY OF MAGNESIUM: CPT | Performed by: EMERGENCY MEDICINE

## 2024-04-03 RX ORDER — ONDANSETRON 2 MG/ML
8 INJECTION INTRAMUSCULAR; INTRAVENOUS ONCE
Status: COMPLETED | OUTPATIENT
Start: 2024-04-03 | End: 2024-04-03

## 2024-04-03 RX ORDER — DILTIAZEM HYDROCHLORIDE 5 MG/ML
25 INJECTION INTRAVENOUS ONCE
Status: COMPLETED | OUTPATIENT
Start: 2024-04-03 | End: 2024-04-03

## 2024-04-03 RX ORDER — DILTIAZEM HYDROCHLORIDE 5 MG/ML
20 INJECTION INTRAVENOUS ONCE
Status: COMPLETED | OUTPATIENT
Start: 2024-04-03 | End: 2024-04-03

## 2024-04-03 RX ADMIN — Medication 150 MG: at 10:47

## 2024-04-03 RX ADMIN — APIXABAN 5 MG: 5 TABLET, FILM COATED ORAL at 09:18

## 2024-04-03 RX ADMIN — ONDANSETRON 8 MG: 2 INJECTION INTRAMUSCULAR; INTRAVENOUS at 11:07

## 2024-04-03 RX ADMIN — SODIUM CHLORIDE 1000 ML: 9 INJECTION, SOLUTION INTRAVENOUS at 09:10

## 2024-04-03 RX ADMIN — DILTIAZEM HYDROCHLORIDE 20 MG: 5 INJECTION, SOLUTION INTRAVENOUS at 09:36

## 2024-04-03 RX ADMIN — DILTIAZEM HYDROCHLORIDE 25 MG: 5 INJECTION, SOLUTION INTRAVENOUS at 10:02

## 2024-04-03 ASSESSMENT — COLUMBIA-SUICIDE SEVERITY RATING SCALE - C-SSRS
6. HAVE YOU EVER DONE ANYTHING, STARTED TO DO ANYTHING, OR PREPARED TO DO ANYTHING TO END YOUR LIFE?: NO
1. IN THE PAST MONTH, HAVE YOU WISHED YOU WERE DEAD OR WISHED YOU COULD GO TO SLEEP AND NOT WAKE UP?: NO
2. HAVE YOU ACTUALLY HAD ANY THOUGHTS OF KILLING YOURSELF IN THE PAST MONTH?: NO

## 2024-04-03 ASSESSMENT — ACTIVITIES OF DAILY LIVING (ADL)
ADLS_ACUITY_SCORE: 35
ADLS_ACUITY_SCORE: 35

## 2024-04-03 NOTE — ED PROVIDER NOTES
EMERGENCY DEPARTMENT ENCOUNTER      NAME: Kit Orona  AGE: 28 year old male  YOB: 1995  MRN: [id number]  EVALUATION DATE & TIME: No admission date for patient encounter.    PCP: No Ref-Primary, Physician    ED PROVIDER: Arlen Power M.D.      Chief Complaint   Patient presents with    Irregular Heart Beat         FINAL IMPRESSION:  1. Atrial fibrillation with rapid ventricular response (H)          ED COURSE & MEDICAL DECISION MAKING:    ED Course as of 04/03/24 1152   Wed Apr 03, 2024   0904 Pt with palpitaitons with onset afib RVR 0630 without chest pain, hypotension, lightheadedness, SOB or leg swelling, like prior afib episode. Given new onset afib RVR, eliquis therapy begun and patient s/p electrical cardioversion one year ago, ameanble to trial of cardizem for chemical cardioversion as /79 and young, healthy and asymptomatic otherwise andn if failure, then RSI with cardioversion or if unstable then cardioversion, patient and wife at bedside ok wtih plan.   0941 Patient now to room on monitor and still in afib with HR in 150s range   1000 Pt wtih sustained BP with afib wihtout aflutter after first dose cardizem, now s/p eliquis, ameanble to trial of repeat dose cardizem, and if no cardioversion at that point attempt RSI cardioversion, RN Tia updated   1021 Patient clinically reassessed and feeling well still without chest pain or SOB but still tachycardic despite second dose of cardizem, amenable to plan to sedate and cardiovert, NPO  since last night, will d/w bedside RN and charge RN, monitoring and consent for sedated cardioversion, pt elects no etomidate with last time feeling like he did not adequately sedate and experienced cardioversion with recollection, will use ketamine   1029 CBC, chemistry and TSH WNL and LFTs WNL   1058 Pt consented for sedated cardioversion, given ketamine 150mg which is between 1-2mg/kg, with sedation noted, and then cardioversion sync performed x1  with afib continuing, repeated with sinus rhythm, 12 lead EKG and CXR pendign after cardioversion and wife back at bedside with patient. Patient awake now and feeling well, will PO challenge   1139 Pt tolerating PO, fully awake, ambulating and feeling well, ok with plan to begin eliquis and f/u with [facility]. Patient discharged after being provided with extensive anticipatory guidance and given return precautions, importance of PMD follow-up emphasized.        Pertinent Labs & Imaging studies reviewed. (See chart for details)    N95 worn  A face shield was worn also  COVID PPE    Medical Decision Making  Obtained supplemental history:Supplemental history obtained?: Family Member/Significant Other  Reviewed external records: External records reviewed?: Outpatient Record: Ed Visit at [facility] 5/29/23 for Afib, cardioversion with etomidate 10 mg and shock of 150 J  Care impacted by chronic illness:Other: PKU  Care significantly affected by social determinants of health:N/A  Did you consider but not order tests?: Work up considered but not performed and documented in chart, if applicable  Did you interpret images independently?: Independent interpretation of ECG and images noted in documentation, when applicable.  Consultation discussion with other provider:Did you involve another provider (consultant, , pharmacy, etc.)?: No  Discharge. I prescribed additional prescription strength medication(s) as charted. I considered admission, but discharged patient after significant clinical improvement.    Critical Care time was 45 minutes for this patient excluding procedures.      At the conclusion of the encounter I discussed the results of all of the tests and the disposition. The questions were answered. The patient or family acknowledged understanding and was agreeable with the care plan.     MEDICATIONS GIVEN IN THE EMERGENCY:  Medications   apixaban ANTICOAGULANT (ELIQUIS) tablet 5 mg (5 mg Oral $Given 4/3/24 3221)    sodium chloride 0.9% BOLUS 1,000 mL (0 mLs Intravenous Stopped 4/3/24 1046)   diltiazem (CARDIZEM) injection 20 mg (20 mg Intravenous $Given 4/3/24 0936)   diltiazem (CARDIZEM) injection 25 mg (25 mg Intravenous $Given 4/3/24 1002)   ketamine (KETALAR) injection 150 mg (150 mg Intravenous $Given 4/3/24 1047)   ondansetron (ZOFRAN) injection 8 mg (8 mg Intravenous $Given 4/3/24 1107)       NEW PRESCRIPTIONS STARTED AT TODAY'S ER VISIT  New Prescriptions    APIXABAN ANTICOAGULANT (ELIQUIS ANTICOAGULANT) 5 MG TABLET    Take 1 tablet (5 mg) by mouth 2 times daily for 30 days          =================================================================    [facility]      Kit Orona is a 28 year old male with PMHx of PKU who presents to the ED today via private car with his wife with palpitations.    The patient reports waking up this morning around 0630 with a sensation of palpitations that felt similar to when he had an episode of Afib last year which required electrocardioversion. He is not feeling short of breath or lightheaded. He is not currently on blood thinners. He reports he occasionally feels PVCs, but his current palpitations feel different than PVCs.    The patient reports he has cut down on his caffeine use since last year. His wife gave birth to twins about a month ago, so he has not been sleeping as much as he did previously. He takes some vitamins and supplements daily, but does not take any regular prescription medications. He denies any recent alcohol use.       REVIEW OF SYSTEMS   All other systems reviewed and are negative except as noted above in HPI.    PAST MEDICAL HISTORY:  No past medical history on file.    PAST SURGICAL HISTORY:  No past surgical history on file.    CURRENT MEDICATIONS:    apixaban ANTICOAGULANT (ELIQUIS ANTICOAGULANT) 5 MG tablet  Amino Acids (XPHE MAXAMUM) POWD  cetirizine (ZYRTEC) 10 MG tablet  sapropterin dihydrochloride (KUVAN) 100 MG tablet        ALLERGIES:  No Known  Allergies    FAMILY HISTORY:  Family History   Problem Relation Age of Onset    No Known Problems Mother     No Known Problems Father     No Known Problems Sister     No Known Problems Brother        SOCIAL HISTORY:   Social History     Socioeconomic History    Marital status: Single   Tobacco Use    Smoking status: Never    Smokeless tobacco: Never       VITALS:  Patient Vitals for the past 24 hrs:   BP Temp Temp src Pulse Resp SpO2   04/03/24 1115 128/68 -- -- 92 18 96 %   04/03/24 1114 -- -- -- 89 25 97 %   04/03/24 1110 -- -- -- 97 26 96 %   04/03/24 1103 -- 98.5  F (36.9  C) Oral -- -- --   04/03/24 1100 (!) 192/119 -- -- 101 21 97 %   04/03/24 1051 (!) 182/80 -- -- (!) 153 21 96 %   04/03/24 1047 -- -- -- -- 16 --   04/03/24 1046 130/75 -- -- -- -- --   04/03/24 1045 -- -- -- (!) 153 16 97 %   04/03/24 0946 -- -- -- (!) 145 16 95 %   04/03/24 0945 129/72 -- -- (!) 143 -- 96 %   04/03/24 0944 -- -- -- (!) 149 -- 96 %   04/03/24 0943 -- -- -- (!) 144 -- 96 %   04/03/24 0942 -- -- -- (!) 154 -- 96 %   04/03/24 0941 -- -- -- (!) 156 -- 96 %   04/03/24 0940 111/78 -- -- (!) 159 16 96 %   04/03/24 0939 -- -- -- (!) 166 -- 96 %   04/03/24 0938 -- -- -- (!) 167 -- 96 %   04/03/24 0937 -- -- -- (!) 167 -- 96 %   04/03/24 0936 -- -- -- (!) 152 -- 96 %   04/03/24 0847 (!) 141/79 97.9  F (36.6  C) -- (!) 175 20 99 %       PHYSICAL EXAM    GENERAL: Awake, alert.  In no acute distress.   HEENT: Normocephalic, atraumatic.  Pupils equal, round and reactive.  Conjunctiva normal.  EOMI.  NECK: No stridor or apparent deformity.  PULMONARY: Symmetrical breath sounds without distress.  Lungs clear to auscultation bilaterally without wheezes, rhonchi or rales.  CARDIO: Rapid, irregular heart rate.  No significant murmur, rub or gallop.  Radial pulses strong and symmetrical.  ABDOMINAL: Abdomen soft, non-distended and non-tender to palpation.  No CVAT, no palpable hepatosplenomegaly.  EXTREMITIES: No lower extremity swelling or  edema.    NEURO: Alert and oriented to person, place and time.  Cranial nerves grossly intact.  No focal motor deficit.  PSYCH: Normal mood and affect  SKIN: No rashes      LAB:  All pertinent labs reviewed and interpreted.  Results for orders placed or performed during the hospital encounter of 04/03/24   XR Chest Port 1 View    Impression    IMPRESSION: Negative chest.   Basic metabolic panel   Result Value Ref Range    Sodium 144 135 - 145 mmol/L    Potassium 4.7 3.4 - 5.3 mmol/L    Chloride 111 (H) 98 - 107 mmol/L    Carbon Dioxide (CO2) 23 22 - 29 mmol/L    Anion Gap 10 7 - 15 mmol/L    Urea Nitrogen 8.3 6.0 - 20.0 mg/dL    Creatinine 0.95 0.67 - 1.17 mg/dL    GFR Estimate >90 >60 mL/min/1.73m2    Calcium 10.1 (H) 8.6 - 10.0 mg/dL    Glucose 90 70 - 99 mg/dL   Result Value Ref Range    Magnesium 2.2 1.7 - 2.3 mg/dL   TSH with free T4 reflex   Result Value Ref Range    TSH 2.86 0.30 - 4.20 uIU/mL   Hepatic function panel   Result Value Ref Range    Protein Total 7.2 6.4 - 8.3 g/dL    Albumin 4.5 3.5 - 5.2 g/dL    Bilirubin Total 0.2 <=1.2 mg/dL    Alkaline Phosphatase 106 40 - 150 U/L    AST 40 0 - 45 U/L    ALT 51 0 - 70 U/L    Bilirubin Direct     CBC with platelets and differential   Result Value Ref Range    WBC Count 10.2 4.0 - 11.0 10e3/uL    RBC Count 5.52 4.40 - 5.90 10e6/uL    Hemoglobin 15.3 13.3 - 17.7 g/dL    Hematocrit 45.7 40.0 - 53.0 %    MCV 83 78 - 100 fL    MCH 27.7 26.5 - 33.0 pg    MCHC 33.5 31.5 - 36.5 g/dL    RDW 13.3 10.0 - 15.0 %    Platelet Count 336 150 - 450 10e3/uL    % Neutrophils 67 %    % Lymphocytes 22 %    % Monocytes 8 %    % Eosinophils 2 %    % Basophils 1 %    % Immature Granulocytes 0 %    NRBCs per 100 WBC 0 <1 /100    Absolute Neutrophils 6.9 1.6 - 8.3 10e3/uL    Absolute Lymphocytes 2.3 0.8 - 5.3 10e3/uL    Absolute Monocytes 0.8 0.0 - 1.3 10e3/uL    Absolute Eosinophils 0.2 0.0 - 0.7 10e3/uL    Absolute Basophils 0.1 0.0 - 0.2 10e3/uL    Absolute Immature Granulocytes  0.0 <=0.4 10e3/uL    Absolute NRBCs 0.0 10e3/uL   Extra Blue Top Tube   Result Value Ref Range    Hold Specimen JIC    Extra Red Top Tube   Result Value Ref Range    Hold Specimen JIC        RADIOLOGY:  Reviewed all pertinent imaging. Please see official radiology report.  XR Chest Port 1 View   Final Result   IMPRESSION: Negative chest.            EKG:    Reviewed and interpreted as: Obtained at 08:48 demonstrating Afib with RVR, rate of 181 bpm. Compared to ECG from 5/14/19, Afib has replaced sinus rhythm.      I have independently reviewed and interpreted the EKG(s) documented above.    PROCEDURES:  PROCEDURE: Electrical Cardioversion with Procedural Sedation   INDICATIONS: Sedation is required to allow for Cardioversion for Atrial Fibrilation    CARDIOVERSION TYPE: Biphasic, External   SEDATION PROVIDER: Dr Arlen Power   CARDIOVERSION PROVIDER: Dr Arlen Power   LEVEL OF SEDATION: Deep Sedation    Defined as:  Minimal = Normal response to verbal  Moderate = Responds to verbal and light tactile stimulation  Deep = Responds after repeated painful stimulation   CONSENT: Risks, benefits and alternatives were discussed with and Written consent was obtained from Patient.   PROCEDURE SPECIFIC CHECKLIST COMPLETED: Yes   LAST ORAL INTAKE: Regular Meal > 8 hours   ASA CLASS: 2 - Mild systemic disease   MALLAMPATI:  I - Faucial pillars, soft palate, and uvula are visible   TIME OUT: Universal protocol was followed. TIME OUT conducted just prior to starting procedure confirmed patient identity, site/side, procedure, patient position, and availability of correct equipment. Yes    Immediately prior to initiation of sedation, reassessment of clinical condition was performed which was unchanged.   MEDICATIONS GIVEN: Ketamine, 150 mg, IV    MONITORING: heart rate, cardiac monitor, continuous pulse oximeter, continuous capnometry (end tidal CO2), frequent blood pressure checks, level of consciousness checks, IV access, constant  attendance by RN until patient is recovered, constant attendance by MD until patient is stable, and intubation and emergency airway equipment available   RESPONSE: vital signs stable, airway patent, and O2 saturations remained >92%   POST-SEDATION ASSESSMENT/PROCEDURE NOTE: CARDIOVERSION: Trial 1: Synchronized shock at 200 joules was Unsuccessful  Trial 2: Synchronized shock at 200 joules was Unsuccessful    SEDATION:  Lowest level oxygen saturation reached was 97%.    Post procedure patient was alert and responds to verbal stimuli    Patient was monitored during recovery and returned to pre-procedure baseline.   TOTAL MD DRUG ADMINISTRATION / MONITORING TIME: 20 minutes.   COMPLICATIONS: Patient tolerated procedure well, without complication       IAlex, am serving as a scribe to document services personally performed by Dr. Arlen Power based on my observation and the provider's statements to me. IArlen MD attest that Alex Voss is acting in a scribe capacity, has observed my performance of the services and has documented them in accordance with my direction.       Arlen Power MD  04/03/24 1558

## 2024-04-03 NOTE — Clinical Note
Kit Orona was seen and treated in our emergency department on 4/3/2024.  He may return to work on 04/04/2024.       If you have any questions or concerns, please don't hesitate to call.      Arlen Power MD

## 2024-04-04 LAB
ATRIAL RATE - MUSE: 113 BPM
DIASTOLIC BLOOD PRESSURE - MUSE: 80 MMHG
INTERPRETATION ECG - MUSE: NORMAL
P AXIS - MUSE: 60 DEGREES
PR INTERVAL - MUSE: 170 MS
QRS DURATION - MUSE: 92 MS
QT - MUSE: 314 MS
QTC - MUSE: 430 MS
R AXIS - MUSE: 37 DEGREES
SYSTOLIC BLOOD PRESSURE - MUSE: 182 MMHG
T AXIS - MUSE: 59 DEGREES
VENTRICULAR RATE- MUSE: 113 BPM

## 2024-04-09 ENCOUNTER — OFFICE VISIT (OUTPATIENT)
Dept: CARDIOLOGY | Facility: CLINIC | Age: 29
End: 2024-04-09
Attending: EMERGENCY MEDICINE
Payer: COMMERCIAL

## 2024-04-09 VITALS
OXYGEN SATURATION: 97 % | DIASTOLIC BLOOD PRESSURE: 78 MMHG | SYSTOLIC BLOOD PRESSURE: 132 MMHG | WEIGHT: 285.8 LBS | HEIGHT: 74 IN | HEART RATE: 100 BPM | BODY MASS INDEX: 36.68 KG/M2

## 2024-04-09 DIAGNOSIS — I48.91 ATRIAL FIBRILLATION WITH RAPID VENTRICULAR RESPONSE (H): ICD-10-CM

## 2024-04-09 PROCEDURE — 99204 OFFICE O/P NEW MOD 45 MIN: CPT | Performed by: INTERNAL MEDICINE

## 2024-04-09 NOTE — LETTER
4/9/2024    Nadir Velasco MD  [address]  [place] [address]    RE: Kit Orona       Dear Colleague,     I had the pleasure of seeing Kit Orona in the [facility].  HPI and Plan:   It is my pleasure to see your patient Kit Orona for investigation of atrial fibrillation.  This is a 28-year-old male who had an episode of atrial fibrillation approximately 6 days ago.  This is his fourth episode of atrial fibrillation.  He had an episode in May of last year and he also had 2 episodes of atrial fibrillation one when he was in high school and the other when he was in college.  The episode that he had recently occurred when he woke from his sleep.  Interestingly he was having a vivid dream where he felt he was having PVCs when he awoke he was in atrial fibrillation.  He went to the emergency room where they gave him diltiazem and then they electrically cardioverted him.  They asked him to stay on Eliquis for 4 weeks post cardioversion as per protocol.  He had an episode in May of last year and he had an electrical cardioversion at that time.  There appears to be no triggering factors but he has noticed sometimes if he has a high caffeine intake that he may develop atrial fibrillation and also it appears that if he is tired with eye twitching etc. that he may also develop atrial fibrillation as well.  The episode in college was after a significant bout of drinking and the episode in high school may have been related to caffeine intake.  He does use some energy drinks because he tends to be sleepy during the day.  He also uses caffeine to stay awake.  He is significantly overweight and has a BMI of 37.  He weighs 285 pounds his wife tells me that he does snore especially if he is lying on his back.  She has noticed occasional episodes of where he stops breathing but not obstructing.  Of interest, he is great grandfather, grandfather and father all had atrial fibrillation  suggesting a familial component.  On his most recent episode he is electrolytes were normal including magnesium and his TSH was also normal.  He has not had an assessment for structural heart disease.  At present he feels well.    Impression:  1.  Paroxysmal atrial fibrillation at a young age.  Possible factors causing atrial fibrillation could be familial given his family history, obstructive sleep apnea given his body habitus and having a history of snoring according to his wife or significant caffeine intake.  Has had 4 episodes now at a relatively young age.  2.  Possible obstructive sleep apnea.  He does have the body habitus for this condition and he does appear to snore when he lies on his back.    Plan:  1.  Will obtain an echocardiogram to determine if there is any structural heart disease that could be triggering the atrial fibrillation.  2.  I referred him for sleep study.  3.  I will have the patient wear a event monitor to determine if he is having episodes of asymptomatic atrial fibrillation.  4.  I will have the patient perform a treadmill stress EKG to see if he has inducible atrial fibrillation which is not uncommon in young people.  5.  I will have the patient follow-up with electrophysiology with the results of these tests.  In this patient, who is young and has had 2 cardioversions now in less than 1 year consideration for ablation procedure should be discussed with the electrophysiologist.    As always the patient has been told to contact me if he is any questions or any concerns.    Aly Rios MD, FACC, FRCPI      Orders Placed This Encounter   Procedures    Sleep Study (referral)    Follow-Up with Cardiology    Cardiac Event Monitor Adult Pediatric    Exercise Stress Test (Stress ECG)    Echocardiogram Complete       No orders of the defined types were placed in this encounter.      There are no discontinued medications.      Encounter Diagnosis   Name Primary?    Atrial fibrillation  "with rapid ventricular response (H)        CURRENT MEDICATIONS:  Current Outpatient Medications   Medication Sig Dispense Refill    apixaban ANTICOAGULANT (ELIQUIS ANTICOAGULANT) 5 MG tablet Take 1 tablet (5 mg) by mouth 2 times daily for 30 days 60 tablet 0    cetirizine (ZYRTEC) 10 MG tablet Take 1 tablet (10 mg) by mouth daily 30 tablet 11    sapropterin dihydrochloride (KUVAN) 100 MG tablet Take 2500 mg (25 tablets) by mouth everyday with food. 750 tablet 11    Amino Acids (XPHE MAXAMUM) POWD Take 200 g by mouth daily (Patient not taking: Reported on 1/23/2024) 6200 g 12       ALLERGIES   No Known Allergies    PAST MEDICAL HISTORY:  History reviewed. No pertinent past medical history.    PAST SURGICAL HISTORY:  History reviewed. No pertinent surgical history.    FAMILY HISTORY:  Family History   Problem Relation Age of Onset    No Known Problems Mother     No Known Problems Father     No Known Problems Sister     No Known Problems Brother        SOCIAL HISTORY:  Social History     Socioeconomic History    Marital status: Single     Spouse name: None    Number of children: None    Years of education: None    Highest education level: None   Tobacco Use    Smoking status: Never    Smokeless tobacco: Never    Tobacco comments:     Occ. Vape-maybe twice a month   Substance and Sexual Activity    Alcohol use: Yes       Review of Systems:  Skin:          Eyes:         ENT:         Respiratory:          Cardiovascular:         Gastroenterology:        Genitourinary:         Musculoskeletal:         Neurologic:         Psychiatric:         Heme/Lymph/Imm:         Endocrine:           Physical Exam:  Vitals: /78   Pulse 100   Ht 1.88 m (6' 2\")   Wt 129.6 kg (285 lb 12.8 oz)   SpO2 97%   BMI 36.69 kg/m      Constitutional:  cooperative, alert and oriented, well developed, well nourished, in no acute distress obese      Skin:  warm and dry to the touch          Head:  normocephalic, no masses or lesions    "     Eyes:  pupils equal and round        Lymph:      ENT:  no pallor or cyanosis        Neck:  carotid pulses are full and equal bilaterally, JVP normal, no carotid bruit        Respiratory:  normal breath sounds, clear to auscultation, normal A-P diameter, normal symmetry, normal respiratory excursion, no use of accessory muscles         Cardiac: regular rhythm, normal S1/S2, no S3 or S4, apical impulse not displaced, no murmurs, gallops or rubs                pulses full and equal, no bruits auscultated                                        GI:           Extremities and Muscular Skeletal:  no deformities, clubbing, cyanosis, erythema observed;no edema              Neurological:  no gross motor deficits;affect appropriate        Psych:  Alert and Oriented x 3        CC  Arlen Power MD  [address] [place] [address]      Thank you for allowing me to participate in the care of your patient.      Sincerely,     Aly Rios MD, MD     [facility]

## 2024-04-09 NOTE — PROGRESS NOTES
HPI and Plan:   It is my pleasure to see your patient Kit Orona for investigation of atrial fibrillation.  This is a 28-year-old male who had an episode of atrial fibrillation approximately 6 days ago.  This is his fourth episode of atrial fibrillation.  He had an episode in May of last year and he also had 2 episodes of atrial fibrillation one when he was in high school and the other when he was in college.  The episode that he had recently occurred when he woke from his sleep.  Interestingly he was having a vivid dream where he felt he was having PVCs when he awoke he was in atrial fibrillation.  He went to the emergency room where they gave him diltiazem and then they electrically cardioverted him.  They asked him to stay on Eliquis for 4 weeks post cardioversion as per protocol.  He had an episode in May of last year and he had an electrical cardioversion at that time.  There appears to be no triggering factors but he has noticed sometimes if he has a high caffeine intake that he may develop atrial fibrillation and also it appears that if he is tired with eye twitching etc. that he may also develop atrial fibrillation as well.  The episode in college was after a significant bout of drinking and the episode in high school may have been related to caffeine intake.  He does use some energy drinks because he tends to be sleepy during the day.  He also uses caffeine to stay awake.  He is significantly overweight and has a BMI of 37.  He weighs 285 pounds his wife tells me that he does snore especially if he is lying on his back.  She has noticed occasional episodes of where he stops breathing but not obstructing.  Of interest, he is great grandfather, grandfather and father all had atrial fibrillation suggesting a familial component.  On his most recent episode he is electrolytes were normal including magnesium and his TSH was also normal.  He has not had an assessment for structural heart disease.  At present  he feels well.    Impression:  1.  Paroxysmal atrial fibrillation at a young age.  Possible factors causing atrial fibrillation could be familial given his family history, obstructive sleep apnea given his body habitus and having a history of snoring according to his wife or significant caffeine intake.  Has had 4 episodes now at a relatively young age.  2.  Possible obstructive sleep apnea.  He does have the body habitus for this condition and he does appear to snore when he lies on his back.    Plan:  1.  Will obtain an echocardiogram to determine if there is any structural heart disease that could be triggering the atrial fibrillation.  2.  I referred him for sleep study.  3.  I will have the patient wear a event monitor to determine if he is having episodes of asymptomatic atrial fibrillation.  4.  I will have the patient perform a treadmill stress EKG to see if he has inducible atrial fibrillation which is not uncommon in young people.  5.  I will have the patient follow-up with electrophysiology with the results of these tests.  In this patient, who is young and has had 2 cardioversions now in less than 1 year consideration for ablation procedure should be discussed with the electrophysiologist.    As always the patient has been told to contact me if he is any questions or any concerns.    Aly Rios MD, FACC, FRCPI      Orders Placed This Encounter   Procedures    Sleep Study (referral)    Follow-Up with Cardiology    Cardiac Event Monitor Adult Pediatric    Exercise Stress Test (Stress ECG)    Echocardiogram Complete       No orders of the defined types were placed in this encounter.      There are no discontinued medications.      Encounter Diagnosis   Name Primary?    Atrial fibrillation with rapid ventricular response (H)        CURRENT MEDICATIONS:  Current Outpatient Medications   Medication Sig Dispense Refill    apixaban ANTICOAGULANT (ELIQUIS ANTICOAGULANT) 5 MG tablet Take 1 tablet (5 mg) by  "mouth 2 times daily for 30 days 60 tablet 0    cetirizine (ZYRTEC) 10 MG tablet Take 1 tablet (10 mg) by mouth daily 30 tablet 11    sapropterin dihydrochloride (KUVAN) 100 MG tablet Take 2500 mg (25 tablets) by mouth everyday with food. 750 tablet 11    Amino Acids (XPHE MAXAMUM) POWD Take 200 g by mouth daily (Patient not taking: Reported on 1/23/2024) 6200 g 12       ALLERGIES   No Known Allergies    PAST MEDICAL HISTORY:  History reviewed. No pertinent past medical history.    PAST SURGICAL HISTORY:  History reviewed. No pertinent surgical history.    FAMILY HISTORY:  Family History   Problem Relation Age of Onset    No Known Problems Mother     No Known Problems Father     No Known Problems Sister     No Known Problems Brother        SOCIAL HISTORY:  Social History     Socioeconomic History    Marital status: Single     Spouse name: None    Number of children: None    Years of education: None    Highest education level: None   Tobacco Use    Smoking status: Never    Smokeless tobacco: Never    Tobacco comments:     Occ. Vape-maybe twice a month   Substance and Sexual Activity    Alcohol use: Yes       Review of Systems:  Skin:          Eyes:         ENT:         Respiratory:          Cardiovascular:         Gastroenterology:        Genitourinary:         Musculoskeletal:         Neurologic:         Psychiatric:         Heme/Lymph/Imm:         Endocrine:           Physical Exam:  Vitals: /78   Pulse 100   Ht 1.88 m (6' 2\")   Wt 129.6 kg (285 lb 12.8 oz)   SpO2 97%   BMI 36.69 kg/m      Constitutional:  cooperative, alert and oriented, well developed, well nourished, in no acute distress obese      Skin:  warm and dry to the touch          Head:  normocephalic, no masses or lesions        Eyes:  pupils equal and round        Lymph:      ENT:  no pallor or cyanosis        Neck:  carotid pulses are full and equal bilaterally, JVP normal, no carotid bruit        Respiratory:  normal breath sounds, clear " to auscultation, normal A-P diameter, normal symmetry, normal respiratory excursion, no use of accessory muscles         Cardiac: regular rhythm, normal S1/S2, no S3 or S4, apical impulse not displaced, no murmurs, gallops or rubs                pulses full and equal, no bruits auscultated                                        GI:           Extremities and Muscular Skeletal:  no deformities, clubbing, cyanosis, erythema observed;no edema              Neurological:  no gross motor deficits;affect appropriate        Psych:  Alert and Oriented x 3        CC  Arlen Power MD  [address] [place] [address]

## 2024-04-18 ENCOUNTER — HOSPITAL ENCOUNTER (OUTPATIENT)
Dept: CARDIOLOGY | Facility: CLINIC | Age: 29
Discharge: HOME | End: 2024-04-18
Attending: INTERNAL MEDICINE
Payer: COMMERCIAL

## 2024-04-18 DIAGNOSIS — I48.91 ATRIAL FIBRILLATION WITH RAPID VENTRICULAR RESPONSE (H): ICD-10-CM

## 2024-04-18 LAB
CV STRESS CURRENT BP HE: NORMAL
CV STRESS CURRENT HR HE: 100
CV STRESS CURRENT HR HE: 100
CV STRESS CURRENT HR HE: 104
CV STRESS CURRENT HR HE: 107
CV STRESS CURRENT HR HE: 109
CV STRESS CURRENT HR HE: 110
CV STRESS CURRENT HR HE: 112
CV STRESS CURRENT HR HE: 114
CV STRESS CURRENT HR HE: 114
CV STRESS CURRENT HR HE: 115
CV STRESS CURRENT HR HE: 116
CV STRESS CURRENT HR HE: 118
CV STRESS CURRENT HR HE: 120
CV STRESS CURRENT HR HE: 120
CV STRESS CURRENT HR HE: 123
CV STRESS CURRENT HR HE: 129
CV STRESS CURRENT HR HE: 136
CV STRESS CURRENT HR HE: 148
CV STRESS CURRENT HR HE: 149
CV STRESS CURRENT HR HE: 157
CV STRESS CURRENT HR HE: 159
CV STRESS CURRENT HR HE: 160
CV STRESS CURRENT HR HE: 164
CV STRESS CURRENT HR HE: 164
CV STRESS CURRENT HR HE: 166
CV STRESS CURRENT HR HE: 171
CV STRESS CURRENT HR HE: 183
CV STRESS CURRENT HR HE: 183
CV STRESS CURRENT HR HE: 184
CV STRESS CURRENT HR HE: 88
CV STRESS CURRENT HR HE: 89
CV STRESS CURRENT HR HE: 99
CV STRESS DEVIATION TIME HE: NORMAL
CV STRESS ECHO PERCENT HR HE: NORMAL
CV STRESS EXERCISE STAGE HE: NORMAL
CV STRESS EXERCISE STAGE REACHED HE: NORMAL
CV STRESS FINAL RESTING BP HE: NORMAL
CV STRESS FINAL RESTING HR HE: 112
CV STRESS MAX HR HE: 184
CV STRESS MAX TREADMILL GRADE HE: 16
CV STRESS MAX TREADMILL SPEED HE: 4.2
CV STRESS PEAK DIA BP HE: NORMAL
CV STRESS PEAK SYS BP HE: NORMAL
CV STRESS PHASE HE: NORMAL
CV STRESS PROTOCOL HE: NORMAL
CV STRESS REASON STOPPED HE: NORMAL
CV STRESS RESTING PT POSITION HE: NORMAL
CV STRESS RESTING PT POSITION HE: NORMAL
CV STRESS ST DEVIATION AMOUNT HE: NORMAL
CV STRESS ST DEVIATION ELEVATION HE: NORMAL
CV STRESS ST EVELATION AMOUNT HE: NORMAL
CV STRESS SYMPTOMS HE: NORMAL
CV STRESS TEST TYPE HE: NORMAL
CV STRESS TOTAL STAGE TIME MIN 1 HE: NORMAL
LVEF ECHO: NORMAL
STRESS ECHO BASELINE DIASTOLIC HE: 79
STRESS ECHO BASELINE HR: NORMAL
STRESS ECHO BASELINE SYSTOLIC BP: 114
STRESS ECHO LAST STRESS DIASTOLIC BP: 62
STRESS ECHO LAST STRESS HR: 183
STRESS ECHO LAST STRESS SYSTOLIC BP: 170
STRESS ECHO POST ESTIMATED WORKLOAD: 11.7
STRESS ECHO POST EXERCISE DUR MIN: 10
STRESS ECHO POST EXERCISE DUR SEC: 0
STRESS ECHO TARGET HR: 163

## 2024-04-18 PROCEDURE — 93016 CV STRESS TEST SUPVJ ONLY: CPT | Performed by: INTERNAL MEDICINE

## 2024-04-18 PROCEDURE — 93306 TTE W/DOPPLER COMPLETE: CPT | Mod: 26 | Performed by: INTERNAL MEDICINE

## 2024-04-18 PROCEDURE — 93018 CV STRESS TEST I&R ONLY: CPT | Performed by: GENERAL ACUTE CARE HOSPITAL

## 2024-04-18 PROCEDURE — 93306 TTE W/DOPPLER COMPLETE: CPT

## 2024-04-18 PROCEDURE — 93270 REMOTE 30 DAY ECG REV/REPORT: CPT

## 2024-04-18 PROCEDURE — 93017 CV STRESS TEST TRACING ONLY: CPT

## 2024-04-30 ENCOUNTER — OFFICE VISIT (OUTPATIENT)
Dept: SLEEP MEDICINE | Facility: CLINIC | Age: 29
End: 2024-04-30
Attending: INTERNAL MEDICINE
Payer: COMMERCIAL

## 2024-04-30 VITALS
BODY MASS INDEX: 36.57 KG/M2 | OXYGEN SATURATION: 98 % | HEART RATE: 78 BPM | WEIGHT: 285 LBS | SYSTOLIC BLOOD PRESSURE: 122 MMHG | HEIGHT: 74 IN | DIASTOLIC BLOOD PRESSURE: 79 MMHG

## 2024-04-30 DIAGNOSIS — I48.91 ATRIAL FIBRILLATION WITH RAPID VENTRICULAR RESPONSE (H): ICD-10-CM

## 2024-04-30 DIAGNOSIS — E66.9 OBESITY (BMI 30-39.9): ICD-10-CM

## 2024-04-30 DIAGNOSIS — R53.83 FATIGUE, UNSPECIFIED TYPE: ICD-10-CM

## 2024-04-30 DIAGNOSIS — R06.83 SNORING: ICD-10-CM

## 2024-04-30 DIAGNOSIS — F51.12 INSUFFICIENT SLEEP SYNDROME: ICD-10-CM

## 2024-04-30 DIAGNOSIS — I48.0 PAROXYSMAL ATRIAL FIBRILLATION (H): ICD-10-CM

## 2024-04-30 DIAGNOSIS — R06.81 WITNESSED EPISODE OF APNEA: ICD-10-CM

## 2024-04-30 DIAGNOSIS — G47.9 SLEEP DISTURBANCE: Primary | ICD-10-CM

## 2024-04-30 LAB
ATRIAL RATE - MUSE: 136 BPM
DIASTOLIC BLOOD PRESSURE - MUSE: NORMAL MMHG
INTERPRETATION ECG - MUSE: NORMAL
P AXIS - MUSE: NORMAL DEGREES
PR INTERVAL - MUSE: NORMAL MS
QRS DURATION - MUSE: 84 MS
QT - MUSE: 260 MS
QTC - MUSE: 451 MS
R AXIS - MUSE: 55 DEGREES
SYSTOLIC BLOOD PRESSURE - MUSE: NORMAL MMHG
T AXIS - MUSE: 31 DEGREES
VENTRICULAR RATE- MUSE: 181 BPM

## 2024-04-30 PROCEDURE — 99204 OFFICE O/P NEW MOD 45 MIN: CPT | Performed by: INTERNAL MEDICINE

## 2024-04-30 RX ORDER — IBUPROFEN 200 MG
200 TABLET ORAL PRN
COMMUNITY

## 2024-04-30 ASSESSMENT — SLEEP AND FATIGUE QUESTIONNAIRES
HOW LIKELY ARE YOU TO NOD OFF OR FALL ASLEEP WHILE LYING DOWN TO REST IN THE AFTERNOON WHEN CIRCUMSTANCES PERMIT: MODERATE CHANCE OF DOZING
HOW LIKELY ARE YOU TO NOD OFF OR FALL ASLEEP WHILE SITTING QUIETLY AFTER LUNCH WITHOUT ALCOHOL: SLIGHT CHANCE OF DOZING
HOW LIKELY ARE YOU TO NOD OFF OR FALL ASLEEP WHILE SITTING INACTIVE IN A PUBLIC PLACE: WOULD NEVER DOZE
HOW LIKELY ARE YOU TO NOD OFF OR FALL ASLEEP WHEN YOU ARE A PASSENGER IN A CAR FOR AN HOUR WITHOUT A BREAK: WOULD NEVER DOZE
HOW LIKELY ARE YOU TO NOD OFF OR FALL ASLEEP WHILE SITTING AND TALKING TO SOMEONE: WOULD NEVER DOZE
HOW LIKELY ARE YOU TO NOD OFF OR FALL ASLEEP WHILE SITTING AND READING: MODERATE CHANCE OF DOZING
HOW LIKELY ARE YOU TO NOD OFF OR FALL ASLEEP IN A CAR, WHILE STOPPED FOR A FEW MINUTES IN TRAFFIC: WOULD NEVER DOZE
HOW LIKELY ARE YOU TO NOD OFF OR FALL ASLEEP WHILE WATCHING TV: MODERATE CHANCE OF DOZING

## 2024-04-30 NOTE — PROGRESS NOTES
Outpatient Sleep Medicine Consultation:      Name: Kit Orona MRN# [id number]   Age: 28 year old YOB: 1995     Date of Consultation: April 30, 2024  Consultation is requested by: Aly Rios MD  [address] [place]  [place] [address] Aly Rios  Primary care provider: Nadir Velasco       Reason for Sleep Consult:     Kit Orona is sent by Aly Rios for a sleep consultation regarding obtaining evaluation for possible sleep disordered breathing in the setting of paroxysmal atrial fibrillation.    Patient s Reason for visit  Kit Orona main reason for visit: afib  Patient states problem(s) started: year ago  Kit Orona's goals for this visit: not sure           Assessment and Plan:   Summary Sleep Diagnoses:  Snoring, Witnessed apneas during sleep.  STOP-BANG score 6 out of 8- high pretest probability for obstructive sleep apnea  Fatigue  Insufficient sleep time    Comorbid Diagnoses:  Paroxysmal atrial fibrillation  Phenylketonuria      Summary Recommendations:  HST/ Polysomnography reviewed.  Due to high pretest probability for LI based on the STOP-BANG score, recommended obtaining  HST to evaluate for sleep disordered breathing.  If home sleep study is negative for sleep disordered breathing, we will consider obtaining in-lab sleep study for further evaluation and he was agreeable with the plan.  Discussed pathophysiology and risks of untreated LI.  We also discussed the association of sleep disordered breathing with atrial fibrillation and other cardiovascular diseases including stroke.  Information provided regarding treatment options for LI.  Patient reported that prefers to try the non-CPAP options first if he has LI  Patient will follow up 3 months after the sleep study. We will review results and initiate treatment (if indicated) over MyChart prior to the follow up.   We discussed the consequences of chronic  "insufficient sleep.  His sleep schedule is different now with the  twins.  Patient was instructed to prioritize sleep, following a regular sleep schedule on work and non-work days and avoid sleep deprivation.  He was also instructed to avoid activities such as using phone, computer or other electronic devices in bed.  We discussed weight management with diet and exercise  He will continue follow-up with cardiology  Patient was strongly advised to avoid driving, operating any heavy machinery or other hazardous situations while drowsy or sleepy.  Patient was counseled on the importance of driving while alert, to pull over if drowsy, or nap before getting into the vehicle if sleepy.       Orders Placed This Encounter   Procedures    HST-Home Sleep Apnea Test - Noxturnal Returnable         Summary Counseling:    Sleep Testing Reviewed  Obstructive Sleep Apnea Reviewed  Complications of Untreated Sleep Apnea Reviewed      Medical Decision-making:   Educational materials provided in instructions    CC: Aly Rios MD       The above note was dictated using voice recognition software. Although reviewed after completion, some word and grammatical error may remain . Please contact the author for any clarifications.     \" Total time spent was 54 minutes for this appointment on this date of service which include time spent before, during and after the visit for chart review, patient care, counseling and coordination of care including documentation.\"      Tino Patel MD  [facility]  [address], [address], [place] [address]            History of Present Illness:     Past Sleep Evaluations: no    SLEEP-WAKE SCHEDULE:     Work/School Days: Patient goes to school/work: Yes (work hours are from 630am-4/5pm)   Usually gets into bed at 8-10  Takes patient about if i take melatonin about 15-20 mins to fall asleep (melatonin 1/2 gummy 5 mg)  Has trouble " falling asleep 5 nights per week  Wakes up in the middle of the night 1-3 times.  Wakes up due to External stimuli (bed partner, pets, noise, etc);Use the bathroom;Uncertain  He has trouble falling back asleep often times a week.   It usually takes 30 mins to get back to sleep  Patient is usually up at 4-6 (early morning play [employer] 2 foot [employer])  Uses alarm: No  He and his wife had their twin babies 2 months ago and have been taking care of the 's in turns that has some impact on the sleep    Weekends/Non-work Days/All Other Days:  Usually gets into bed at 9-12   Takes patient about depends if i take melatonin 20 mins..... if not maybe 30 to an hour to fall asleep  Patient is usually up at 6to7  Uses alarm: No    He reports waking up feeling rested. Start getting tired 4 hours later.  Sleep Need  Patient gets  5-7 hours sleep on average   Patient thinks he needs about not sure sleep    Kit KIRILL Carrilloy prefers to sleep in this position(s): Side;Stomach;Head Elevated   Patient states they do the following activities in bed: Use phone, computer, or tablet    Naps  Patient takes a purposeful nap two or three times a week and naps are usually 30 mins to 3h in duration since twins were born  He feels better after a nap: Yes  He dozes off unintentionally not often days per week  Patient has had a driving accident or near-miss due to sleepiness/drowsiness: No      SLEEP DISRUPTIONS:    Breathing/Snoring  Patient snores:Yes when he sleeps on his back  Other people complain about his snoring: No  Patient has been told he stops breathing in his sleep:Yes occasionally when he sleeps on his back  Gasping: Occasionally  He has issues with the following: Getting up to urinate more than once    Movement:  Patient gets pain, discomfort, with an urge to move:  No  It happens when he is resting:  No  It happens more at night:  No  Patient has been told he kicks his legs at night:  No     Behaviors in Sleep:  Kit ROY  Adwoa has experienced the following behaviors while sleeping: Waking up paralyzed may be 3 time his entire life  He used to sleep walk during childhood and talk in his sleep when he was in high school but not since then   There are no reports of sleep eating or dream enactment behavior   He has experienced sudden muscle weakness during the day: No      Is there anything else you would like your sleep provider to know:          CAFFEINE AND OTHER SUBSTANCES:    Patient consumes caffeinated beverages per day:  0 he used to drink excessive caffeine previously but  decreased it significantly and has stopped having caffeine lately due to the atrial fibrillation   Last caffeine use is usually: not scnce befr afib episode  List of any prescribed or over the counter stimulants that patient takes: na  List of any prescribed or over the counter sleep medication patient takes:    List of previous sleep medications that patient has tried: melatonin  Patient drinks alcohol to help them sleep: No  Patient drinks alcohol near bedtime: No    Family History:  Patient has a family member been diagnosed with a sleep disorder: No          SCALES:    EPWORTH SLEEPINESS SCALE         4/30/2024     2:04 PM    [place] Sleepiness Scale ( JOSELINE Estrada  9251-1426<br>ESS - [place]/English - Final version - 21 Nov 07 - [facility] [place].)   Sitting and reading Moderate chance of dozing   Watching TV Moderate chance of dozing   Sitting, inactive in a public place (e.g. a theatre or a meeting) Would never doze   As a passenger in a car for an hour without a break Would never doze   Lying down to rest in the afternoon when circumstances permit Moderate chance of dozing   Sitting and talking to someone Would never doze   Sitting quietly after a lunch without alcohol Slight chance of dozing   In a car, while stopped for a few minutes in traffic Would never doze   [place] Score (MC) 7   [place] Score (Sleep) 7         INSOMNIA SEVERITY INDEX (OSCAR)  "         4/30/2024     1:51 PM   Insomnia Severity Index (OSCAR)   Difficulty falling asleep 2   Difficulty staying asleep 2   Problems waking up too early 4   How SATISFIED/DISSATISFIED are you with your CURRENT sleep pattern? 2   How NOTICEABLE to others do you think your sleep problem is in terms of impairing the quality of your life? 2   How WORRIED/DISTRESSED are you about your current sleep problem? 1   To what extent do you consider your sleep problem to INTERFERE with your daily functioning (e.g. daytime fatigue, mood, ability to function at work/daily chores, concentration, memory, mood, etc.) CURRENTLY? 3   OSCAR Total Score 16       Guidelines for Scoring/Interpretation:  Total score categories:  0-7 = No clinically significant insomnia   8-14 = Subthreshold insomnia   15-21 = Clinical insomnia (moderate severity)  22-28 = Clinical insomnia (severe)  Used via courtesy of www.[place].gov with permission from Osito Yang PhD., [facility]      STOP BANG 6 out of 8        4/30/2024     2:04 PM   STOP BANG Questionnaire (  2008, the American Society of Anesthesiologists, Inc. [employer].)   1. Snoring - Do you snore loudly (louder than talking or loud enough to be heard through closed doors)? Yes   2. Tired - Do you often feel tired, fatigued, or sleepy during daytime? Yes   3. Observed - Has anyone observed you stop breathing during your sleep? Yes   4. Blood pressure - Do you have or are you being treated for high blood pressure? No   5. BMI - BMI more than 35 kg/m2? Yes   6. Age - Age over 50 yr old? No   7. Neck circumference - Neck circumference greater than 40 cm? Yes   8. Gender - Gender male? Yes   STOP BANG Score (MC): 6         GAD7         No data to display                  CAGE-AID         No data to display                CAGE-AID reprinted with permission from the [place] Medical Journal, LUCIEN Hines. and OSIEL Patel, \"Conjoint screening questionnaires for " "alcohol and drug abuse\" [facility] Journal 94: 135-140, 1995.      PATIENT HEALTH QUESTIONNAIRE-9 (PHQ - 9)         No data to display                Developed by Chante Blue, Cathy Kaufman, Ruperto Blanca and colleagues, with an educational natalee from [employer] No permission required to reproduce, translate, display or distribute.        Allergies:    No Known Allergies    Medications:    Current Outpatient Medications   Medication Sig Dispense Refill    apixaban ANTICOAGULANT (ELIQUIS ANTICOAGULANT) 5 MG tablet Take 1 tablet (5 mg) by mouth 2 times daily for 30 days 60 tablet 0    cetirizine (ZYRTEC) 10 MG tablet Take 1 tablet (10 mg) by mouth daily 30 tablet 11    ibuprofen (ADVIL/MOTRIN) 200 MG tablet Take 200 mg by mouth      sapropterin dihydrochloride (KUVAN) 100 MG tablet Take 2500 mg (25 tablets) by mouth everyday with food. 750 tablet 11    Amino Acids (XPHE MAXAMUM) POWD Take 200 g by mouth daily 6200 g 12       Problem List:  Patient Active Problem List    Diagnosis Date Noted    Phenylketonuria (PKU) - Ku[facility] responsive 12/13/2012     Priority: Medium        Past Medical/Surgical History:  No past medical history on file.  No past surgical history on file.    Social History:  Social History     Socioeconomic History    Marital status: Single     Spouse name: Not on file    Number of children: Not on file    Years of education: Not on file    Highest education level: Not on file   Occupational History    Not on file   Tobacco Use    Smoking status: Never    Smokeless tobacco: Never    Tobacco comments:     Occ. Vape-maybe twice a month   Substance and Sexual Activity    Alcohol use: Yes    Drug use: Not on file    Sexual activity: Not on file   Other Topics Concern    Not on file   Social History Narrative    Not on file     Social Determinants of Health     Financial Resource Strain: Not on file   Food Insecurity: Not on file   Transportation Needs: Not on file   Physical " "Activity: Not on file   Stress: Not on file   Social Connections: Not on file   Interpersonal Safety: Not on file   Housing Stability: Not on file       Family History:  Family History   Problem Relation Age of Onset    No Known Problems Mother     No Known Problems Father     No Known Problems Sister     No Known Problems Brother        Review of Systems:  A complete review of systems reviewed by me is negative with the exeption of what has been mentioned in the history of present illness.  In the last TWO WEEKS have you experienced any of the following symptoms?  Fevers: No  Night Sweats: No  Weight Gain: No  Pain at Night: No  Double Vision: No  Changes in Vision: No  Difficulty Breathing through Nose: No  Sore Throat in Morning: No  Dry Mouth in the Morning: No  Shortness of Breath Lying Flat: No  Shortness of Breath With Activity: No  Awakening with Shortness of Breath: No  Increased Cough: No  Heart Racing at Night: No  Swelling in Feet or Legs: No  Diarrhea at Night: No  Heartburn at Night: No  Urinating More than Once at Night: Yes  Losing Control of Urine at Night: No  Joint Pains at Night: No  Headaches in Morning: No  Weakness in Arms or Legs: No  Depressed Mood: No  Anxiety: No     Physical Examination:  Vitals: /79   Pulse 78   Ht 1.88 m (6' 2.02\")   Wt 129.3 kg (285 lb)   SpO2 98%   BMI 36.58 kg/m    BMI= Body mass index is 36.58 kg/m .    Neck Cir (cm): 43 cm      General: No apparent distress, appropriately groomed  Head: Normocephalic, atraumatic  Nose, mouth and throat: Inferior nasal turbinate hypertrophy with slightly reduced airflow through the right nostril  Oropharynx: Mallampati Class: III.  Low draping soft palate, thick and elongated uvula  Tonsillar Stage: 2  visible at pillars.  Neck:Circumference: 16.9 inches  Chest: clear to auscultation - no rales, rhonchi or wheezes  CV: regular rates and rhythm, normal S1 S2, no S3 or S4, and no murmurs  Psych: coherent speech, normal rate " "and volume, able to articulate logical thoughts, able   to abstract reason, no tangential thoughts, no hallucinations   or delusions  His affect is normal   Neuro:  Mental status: Alert and  Oriented X 3  Speech: normal            Data: All pertinent previous laboratory data reviewed     Recent Labs   Lab Test 04/03/24  0905 05/14/19  1654    141   POTASSIUM 4.7 4.3   CHLORIDE 111* 105   CO2 23 24   ANIONGAP 10 12   GLC 90 97   BUN 8.3 13   CR 0.95 1.00   ANUJA 10.1*  --        Recent Labs   Lab Test 04/03/24  0905   WBC 10.2   RBC 5.52   HGB 15.3   HCT 45.7   MCV 83   MCH 27.7   MCHC 33.5   RDW 13.3          Recent Labs   Lab Test 04/03/24 0905   PROTTOTAL 7.2   ALBUMIN 4.5   BILITOTAL 0.2   ALKPHOS 106   AST 40   ALT 51       TSH (uIU/mL)   Date Value   04/03/2024 2.86   05/14/2019 1.63       No results found for: \"UAMP\", \"UBARB\", \"BENZODIAZEUR\", \"UCANN\", \"UCOC\", \"OPIT\", \"UPCP\"    No results found for: \"IRONSAT\", \"CL31271\", \"YESSICA\"    No results found for: \"PH\", \"PHARTERIAL\", \"PO2\", \"PK0KDSYMMRP\", \"SAT\", \"PCO2\", \"HCO3\", \"BASEEXCESS\", \"BRENNAN\", \"BEB\"    Echocardiography: 4/18/2024  Interpretation Summary:     The left ventricle is normal in size.  Left ventricular function is normal.The ejection fraction is 55-60%.  Normal right ventricle size and systolic function.  No hemodynamically significant valvular abnormalities on 2D or color flow  imaging.  ________________    Chest x-ray: No results found for this or any previous visit from the past 365 days.      Chest CT: No results found for this or any previous visit from the past 365 days.      PFT: Most Recent B[place]ze Pulmonary Function Testing: No results found        Snigdhteresa Patel MD 4/30/2024           "

## 2024-04-30 NOTE — PATIENT INSTRUCTIONS
"          MY TREATMENT INFORMATION FOR SLEEP APNEA-  Kit [place]    DOCTOR : Tino Patel MD    Am I having a home sleep study?  --->Watch the video for the device you are using:    -/drop off device-   https://www.C2cube.com/watch?v=yGGFBdELGhk      Frequently asked questions:  1. What is Obstructive Sleep Apnea (LI)? LI is the most common type of sleep apnea. Apnea means, \"without breath.\"  Apnea is most often caused by narrowing or collapse of the upper airway as muscles relax during sleep.   Almost everyone has occasional apneas. Most people with sleep apnea have had brief interruptions at night frequently for many years.  The severity of sleep apnea is related to how frequent and severe the events are.   2. What are the consequences of LI? Symptoms include: feeling sleepy during the day, snoring loudly, gasping or stopping of breathing, trouble sleeping, and occasionally morning headaches or heartburn at night.  Sleepiness can be serious and even increase the risk of falling asleep while driving. Other health consequences may include development of high blood pressure and other cardiovascular disease in persons who are susceptible. Untreated LI  can contribute to heart disease, stroke and diabetes.   3. What are the treatment options? In most situations, sleep apnea is a lifelong disease that must be managed with daily therapy. Medications are not effective for sleep apnea and surgery is generally not considered until other therapies have been tried. Your treatment is your choice . Continuous Positive Airway (CPAP) works right away and is the therapy that is effective in nearly everyone. An oral device to hold your jaw forward is usually the next most reliable option. Other options include postioning devices (to keep you off your back), weight loss, and surgery including a tongue pacing device. There is more detail about some of these options below.  4. Are my sleep " studies covered by insurance? Although we will request verification of coverage, we advise you also check in advance of the study to ensure there is coverage.    Important tips for those choosing CPAP and similar devices  REMEMBER-IF YOU RECEIVE A CALL FROM  [phone]-->IT IS TO SETUP A DEVICE  For new devices, sign up for device ALEXUS to monitor your device for your followup visits  We encourage you to utilize the codesy alexus or website ( https://Brightstorm.[employer]/ ) to monitor your therapy progress and share the data with your healthcare team when you discuss your sleep apnea.                                                    Know your equipment:  CPAP is continuous positive airway pressure that prevents obstructive sleep apnea by keeping the throat from collapsing while you are sleeping. In most cases, the device is  smart  and can slowly self-adjusts if your throat collapses and keeps a record every day of how well you are treated-this information is available to you and your care team.  BPAP is bilevel positive airway pressure that keeps your throat open and also assists each breath with a pressure boost to maintain adequate breathing.  Special kinds of BPAP are used in patients who have inadequate breathing from lung or heart disease. In most cases, the device is  smart  and can slowly self-adjusts to assist breathing. Like CPAP, the device keeps a record of how well you are treated.  Your mask is your connection to the device. You get to choose what feels most comfortable and the staff will help to make sure if fits. Here: are some examples of the different masks that are available: Magnetic mask aids may assist with use but there are safety issues that should be addressed when considering with magnets* ( see end of discussion).       Key points to remember on your journey with sleep apnea:  Sleep study.  PAP devices often need to be adjusted during a sleep study to show that they are effective and  adjusted right.  Good tips to remember: Try wearing just the mask during a quiet time during the day so your body adapts to wearing it. A humidifier is recommended for comfort in most cases to prevent drying of your nose and throat. Allergy medication from your provider may help you if you are having nasal congestion.  Getting settled-in. It takes more than one night for most of us to get used to wearing a mask. Try wearing just the mask during a quiet time during the day so your body adapts to wearing it. A humidifier is recommended for comfort in most cases. Our team will work with you carefully on the first day and will be in contact within 4 days and again at 2 and 4 weeks for advice and remote device adjustments. Your therapy is evaluated by the device each day.   Use it every night. The more you are able to sleep naturally for 7-8 hours, the more likely you will have good sleep and to prevent health risks or symptoms from sleep apnea. Even if you use it 4 hours it helps. Occasionally all of us are unable to use a medical therapy, in sleep apnea, it is not dangerous to miss one night.   Communicate. Call our skilled team on the number provided on the first day if your visit for problems that make it difficult to wear the device. Over 2 out of 3 patients can learn to wear the device long-term with help from our team. Remember to call our team or your sleep providers if you are unable to wear the device as we may have other solutions for those who cannot adapt to mask CPAP therapy. It is recommended that you sleep your sleep provider within the first 3 months and yearly after that if you are not having problems.   Use it for your health. We encourage use of CPAP masks during daytime quiet periods to allow your face and brain to adapt to the sensation of CPAP so that it will be a more natural sensation to awaken to at night or during naps. This can be very useful during the first few weeks or months of adapting to  CPAP though it does not help medically to wear CPAP during wakefulness and  should not be used as a strategy just to meet guidelines.  Take care of your equipment. Make sure you clean your mask and tubing using directions every day and that your filter and mask are replaced as recommended or if they are not working.     *Masks with magnets:  Updated Contraindications  Masks with magnetic components are contraindicated for use by patients where they, or anyone in close physical contact while using the mask, have the following:   Active medical implants that interact with magnets (i.e., pacemakers, implantable cardioverter defibrillators (ICD), neurostimulators, cerebrospinal fluid (CSF) shunts, insulin/infusion pumps)   Metallic implants/objects containing ferromagnetic material (i.e., aneurysm clips/flow disruption devices, embolic coils, stents, valves, electrodes, implants to restore hearing or balance with implanted magnets, ocular implants, metallic splinters in the eye)  Updated Warning  Keep the mask magnets at a safe distance of at least 6 inches (150 mm) away from implants or medical devices that may be adversely affected by magnetic interference. This warning applies to you or anyone in close physical contact with your mask. The magnets are in the frame and lower headgear clips, with a magnetic field strength of up to 400mT. When worn, they connect to secure the mask but may inadvertently detach while asleep.  Implants/medical devices, including those listed within contraindications, may be adversely affected if they change function under external magnetic fields or contain ferromagnetic materials that attract/repel to magnetic fields (some metallic implants, e.g., contact lenses with metal, dental implants, metallic cranial plates, screws, cory hole covers, and bone substitute devices). Consult your physician and  of your implant / other medical device for information on the potential adverse  effects of magnetic fields.    BESIDES CPAP, WHAT OTHER THERAPIES ARE THERE?    Positioning Device  Positioning devices are generally used when sleep apnea is mild and only occurs on your back.This example shows a pillow that straps around the waist. It may be appropriate for those whose sleep study shows milder sleep apnea that occurs primarily when lying flat on one's back. Preliminary studies have shown benefit but effectiveness at home may need to be verified by a home sleep test. These devices are generally not covered by medical insurance.  Examples of devices that maintain sleeping on the back to prevent snoring and mild sleep apnea.    Belt type body positioner  http://Liibook.Excelimmune/    Electronic reminder  http://nightshifttherapy.com/            Oral Appliance  What is oral appliance therapy?  An oral appliance device fits on your teeth at night like a retainer used after having braces. The device is made by a specialized dentist and requires several visits over 1-2 months before a manufactured device is made to fit your teeth and is adjusted to prevent your sleep apnea. Once an oral device is working properly, snoring should be improved. A home sleep test may be recommended at that time if to determine whether the sleep apnea is adequately treated.       Some things to remember:  -Oral devices are often, but not always, covered by your medical insurance. Be sure to check with your insurance provider.   -If you are referred for oral therapy, you will be given a list of specialized dentists to consider or you may choose to visit the Web site of the American Academy of Dental Sleep Medicine  -Oral devices are less likely to work if you have severe sleep apnea or are extremely overweight.     More detailed information  An oral appliance is a small acrylic device that fits over the upper and lower teeth  (similar to a retainer or a mouth guard). This device slightly moves jaw forward, which moves the base of  the tongue forward, opens the airway, improves breathing for effective treat snoring and obstructive sleep apnea in perhaps 7 out of 10 people .  The best working devices are custom-made by a dental device  after a mold is made of the teeth 1, 2, 3.  When is an oral appliance indicated?  Oral appliance therapy is recommended as a first-line treatment for patients with primary snoring, mild sleep apnea, and for patients with moderate sleep apnea who prefer appliance therapy to use of CPAP4, 5. Severity of sleep apnea is determined by sleep testing and is based on the number of respiratory events per hour of sleep.   How successful is oral appliance therapy?  The success rate of oral appliance therapy in patients with mild sleep apnea is 75-80% while in patients with moderate sleep apnea it is 50-70%. The chance of success in patients with severe sleep apnea is 40-50%. The research also shows that oral appliances have a beneficial effect on the cardiovascular health of LI patients at the same magnitude as CPAP therapy7.  Oral appliances should be a second-line treatment in cases of severe sleep apnea, but if not completely successful then a combination therapy utilizing CPAP plus oral appliance therapy may be effective. Oral appliances tend to be effective in a broad range of patients although studies show that the patients who have the highest success are females, younger patients, those with milder disease, and less severe obesity. 3, 6.   Finding a dentist that practices dental sleep medicine  Specific training is available through the American Academy of Dental Sleep Medicine for dentists interested in working in the field of sleep. To find a dentist who is educated in the field of sleep and the use of oral appliances, near you, visit the Web site of the American Academy of Dental Sleep Medicine.    References  1. Radha et al. Objectively measured vs self-reported compliance during oral  appliance therapy for sleep-disordered breathing. Chest 2013; 144(5): 6142-2631.  2. Ana Maria, et al. Objective measurement of compliance during oral appliance therapy for sleep-disordered breathing. Thorax 2013; 68(1): 91-96.  3. Jeff et al. Mandibular advancement devices in 620 men and women with LI and snoring: tolerability and predictors of treatment success. Chest 2004; 125: 2170-2974.  4. Cm, et al. Oral appliances for snoring and LI: a review. Sleep 2006; 29: 244-262.  5. Hieu et al. Oral appliance treatment for LI: an update. J Clin Sleep Med 2014; 10(2): 215-227.  6. Reg et al. Predictors of OSAH treatment outcome. J Dent Res 2007; 86: 7762-0609.      Weight Loss:   Your Body mass index is 36.58 kg/m .    Being overweight does not necessarily mean you will have health consequences.  Those who have BMI over 35 or over 27 with existing medical conditions carries greater risk.   Weight loss decreases severity of sleep apnea in most people with obesity. For those with mild obesity who have developed snoring with weight gain, even 15-30 pound weight loss can improve and occasionally milder eliminate sleep apnea.  Structured and life-long dietary and health habits are necessary to lose weight and keep healthier weight levels.     The Comprehensive Weight loss program offers all aspects of weight loss strategies including two Non-Surgical Weight Loss Programs: Medical Weight Management and our 24 Week Healthy Lifestyle Program:    Medical Weight Management: You will meet with a Medical Weight Management Provider, as well as a Registered Dietician. The program may include medication therapy, dietary education, recommended exercise and physical therapy programs, monthly support group meetings, and possible psychological counseling. Follow up visits with the provider or dietician are scheduled based on your progress and needs.    24 Week Healthy Lifestyle Program: This unique program is  designed to give you the support of weekly appointments and activities thru a 24-week period. It may include all of the components of the basic program (above), with the addition of 11 individual Health  Visits, 24-week access to the [employer] website for over 700 online classes, and monthly support group meetings. This program has an out-of-pocket expense of $499 to cover the items that can not be billed to insurance (health coaches and [employer] access), and is non-refundable/non-transferable (you may be able to use a Health Savings Account; ask your HSA provider). There may be an optional meal replacement plan prescribed as well.   Surgical management achieves meaningful long-term weight loss and improvement in health risks in most patients with more severe obesity.      Sleep Apnea Surgery:    Surgery for obstructive sleep apnea is considered generally only when other therapies fail to work. Surgery may be discussed with you if you are having a difficult time tolerating CPAP and or when there is an abnormal structure that requires surgical correction.  Nose and throat surgeries often enlarge the airway to prevent collapse.  Most of these surgeries create pain for 1-2 weeks and up to half of the most common surgeries are not effective throughout life.  You should carefully discuss the benefits and drawbacks to surgery with your sleep provider and surgeon to determine if it is the best solution for you.   More information  Surgery for LI is directed at areas that are responsible for narrowing or complete obstruction of the airway during sleep.  There are a wide range of procedures available to enlarge and/or stabilize the airway to prevent blockage of breathing in the three major areas where it can occur: the palate, tongue, and nasal regions.  Successful surgical treatment depends on the accurate identification of the factors responsible for obstructive sleep apnea in each person.  A personalized approach  is required because there is no single treatment that works well for everyone.  Because of anatomic variation, consultation with an examination by a sleep surgeon is a critical first step in determining what surgical options are best for each patient.  In some cases, examination during sedation may be recommended in order to guide the selection of procedures.  Patients will be counseled about risks and benefits as well as the typical recovery course after surgery. Surgery is typically not a cure for a person s LI.  However, surgery will often significantly improve one s LI severity (termed  success rate ).  Even in the absence of a cure, surgery will decrease the cardiovascular risk associated with OSA7; improve overall quality of life8 (sleepiness, functionality, sleep quality, etc).      Palate Procedures:  Patients with LI often have narrowing of their airway in the region of their tonsils and uvula.  The goals of palate procedures are to widen the airway in this region as well as to help the tissues resist collapse.  Modern palate procedure techniques focus on tissue conservation and soft tissue rearrangement, rather than tissue removal.  Often the uvula is preserved in this procedure. Residual sleep apnea is common in patient after pharyngoplasty with an average reduction in sleep apnea events of 33%2.      Tongue Procedures:  ExamWhile patients are awake, the muscles that surround the throat are active and keep this region open for breathing. These muscles relax during sleep, allowing the tongue and other structures to collapse and block breathing.  There are several different tongue procedures available.  Selection of a tongue base procedure depends on characteristics seen on physical exam.  Generally, procedures are aimed at removing bulky tissues in this area or preventing the back of the tongue from falling back during sleep.  Success rates for tongue surgery range from 50-62%3.    Hypoglossal Nerve  Stimulation:  Hypoglossal nerve stimulation has recently received approval from the [place] Food and Drug Administration for the treatment of obstructive sleep apnea.  This is based on research showing that the system was safe and effective in treating sleep apnea6.  Results showed that the median AHI score decreased 68%, from 29.3 to 9.0. This therapy uses an implant system that senses breathing patterns and delivers mild stimulation to airway muscles, which keeps the airway open during sleep.  The system consists of three fully implanted components: a small generator (similar in size to a pacemaker), a breathing sensor, and a stimulation lead.  Using a small handheld remote, a patient turns the therapy on before bed and off upon awakening.    Candidates for this device must be greater than 18 years of age, have moderate to severe obstructive sleep apnea with less than 25% central events  (AHI between 15-65), BMI less than 35, have tried CPAP/oral appliance for at least 8 weeks without success, and have appropriate upper airway anatomy (determined by a sleep endoscopy performed by Dr. Chuck Hopson or Dr. Anthony Phipps).    Nasal Procedures:  Nasal obstruction can interfere with nasal breathing during the day and night.  Studies have shown that relief of nasal obstruction can improve the ability of some patients to tolerate positive airway pressure therapy for obstructive sleep apnea1.  Treatment options include medications such as nasal saline, topical corticosteroid and antihistamine sprays, and oral medications such as antihistamines or decongestants. Non-surgical treatments can include external nasal dilators for selected patients. If these are not successful by themselves, surgery can improve the nasal airway either alone or in combination with these other options.        Combination Procedures:  Combination of surgical procedures and other treatments may be recommended, particularly if patients have more  than one area of narrowing or persistent positional disease.  The success rate of combination surgery ranges from 66-80%2,3.    References  Kemal SORENSEN. The Role of the Nose in Snoring and Obstructive Sleep Apnoea: An Update.  Eur Arch Otorhinolaryngol. 2011; 268: 1365-73.   Suri SM; Jr JA; Jeri JR; Pallanch JF; Linda MB; Yon SG; Julio C MOSER. Surgical modifications of the upper airway for obstructive sleep apnea in adults: a systematic review and meta-analysis. SLEEP 2010;33(10):2037-6077. Roberto RAMÍREZ. Hypopharyngeal surgery in obstructive sleep apnea: an evidence-based medicine review.  Arch Otolaryngol Head Neck Surg. 2006 Feb;132(2):206-13.  Hi YH1, Calixto Y, Salas KAITLYNN. The efficacy of anatomically based multilevel surgery for obstructive sleep apnea. Otolaryngol Head Neck Surg. 2003 Oct;129(4):327-35.  Kezirian E, Goldberg A. Hypopharyngeal Surgery in Obstructive Sleep Apnea: An Evidence-Based Medicine Review. Arch Otolaryngol Head Neck Surg. 2006 Feb;132(2):206-13.  Miguel Angel PJ et al. Upper-Airway Stimulation for Obstructive Sleep Apnea.  N Engl J Med. 2014 Jan 9;370(2):139-49.  Aruna Y et al. Increased Incidence of Cardiovascular Disease in Middle-aged Men with Obstructive Sleep Apnea. Am J Respir Crit Care Med; 2002 166: 159-165  Palacio EM et al. Studying Life Effects and Effectiveness of Palatopharyngoplasty (SLEEP) study: Subjective Outcomes of Isolated Uvulopalatopharyngoplasty. Otolaryngol Head Neck Surg. 2011; 144: 623-631.        WHAT IF I ONLY HAVE SNORING?    Mandibular advancement devices, lateral sleep positioning, long-term weight loss and treatment of nasal allergies have been shown to improve snoring.  Exercising tongue muscles with a game (https://apps.Yibailin/us/alexus/soundly-reduce-snoring/xe4777884993) or stimulating the tongue during the day with a device (https://doi.org/10.3390/fqa16677042) have improved snoring in some  individuals.  https://www.Nexalogy.Sensitive Object/  https://www.sleepfoundation.org/best-anti-snoring-mouthpieces-and-mouthguards    Remember to Drive Safe... Drive Alive     Sleep health profoundly affects your health, mood, and your safety.  Thirty three percent of the population (one in three of us) is not getting enough sleep and many have a sleep disorder. Not getting enough sleep or having an untreated / undertreated sleep condition may make us sleepy without even knowing it. In fact, our driving could be dramatically impaired due to our sleep health. As your provider, here are some things I would like you to know about driving:     Here are some warning signs for impairment and dangerous drowsy driving:              -Having been awake more than 16 hours               -Looking tired               -Eyelid drooping              -Head nodding (it could be too late at this point)              -Driving for more than 30 minutes     Some things you could do to make the driving safer if you are experiencing some drowsiness:              -Stop driving and rest              -Call for transportation              -Make sure your sleep disorder is adequately treated     Some things that have been shown NOT to work when experiencing drowsiness while driving:              -Turning on the radio              -Opening windows              -Eating any  distracting  /  entertaining  foods (e.g., sunflower seeds, candy, or any other)              -Talking on the phone      Your decision may not only impact your life, but also the life of others. Please, remember to drive safe for yourself and all of us.                     Your BMI is Body mass index is 36.58 kg/m .    What is BMI?  Body mass index (BMI) is one way to tell whether you are at a healthy weight, overweight, or obese. It measures your weight in relation to your height.  A BMI of 18.5 to 24.9 is in the healthy range. A person with a BMI of 25 to 29.9 is considered overweight,  and someone with a BMI of 30 or greater is considered obese.  Another way to find out if you are at risk for health problems caused by overweight and obesity is to measure your waist. If you are a woman and your waist is more than 35 inches, or if you are a man and your waist is more than 40 inches, your risk of disease may be higher.  More than two-thirds of American adults are considered overweight or obese. Being overweight or obese increases the risk for further weight gain.  Excess weight may lead to heart disease and diabetes. Creating and following plans for healthy eating and physical activity may help you improve your health.    Methods for maintaining or losing weight.  Weight control is part of healthy lifestyle and includes exercise, emotional health, and healthy eating habits.  Careful eating habits lifelong is the mainstay of weight control.  Though there are significant health benefits from weight loss, long-term weight loss with diet alone may be very difficult to achieve- studies show long-term success with dietary management in less than 10% of people. Attaining a healthy weight may be especially difficult to achieve in those with severe obesity. In some cases, medications, devices and surgical management might be considered.    What can you do?  If you are overweight or obese and are interested in methods for weight loss, you should discuss this with your provider. In addition, we recommend that you review healthy life styles and methods for weight loss available through the National Institutes of Health patient information sites:   http://win.niddk.nih.gov/publications/index.htm

## 2024-05-02 ENCOUNTER — DOCUMENTATION ONLY (OUTPATIENT)
Dept: SLEEP MEDICINE | Facility: CLINIC | Age: 29
End: 2024-05-02

## 2024-05-02 ENCOUNTER — OFFICE VISIT (OUTPATIENT)
Dept: SLEEP MEDICINE | Facility: CLINIC | Age: 29
End: 2024-05-02
Payer: COMMERCIAL

## 2024-05-02 DIAGNOSIS — G47.9 SLEEP DISTURBANCE: ICD-10-CM

## 2024-05-02 PROCEDURE — 95800 SLP STDY UNATTENDED: CPT | Performed by: INTERNAL MEDICINE

## 2024-05-02 ASSESSMENT — SLEEP AND FATIGUE QUESTIONNAIRES
HOW LIKELY ARE YOU TO NOD OFF OR FALL ASLEEP WHILE LYING DOWN TO REST IN THE AFTERNOON WHEN CIRCUMSTANCES PERMIT: MODERATE CHANCE OF DOZING
HOW LIKELY ARE YOU TO NOD OFF OR FALL ASLEEP WHEN YOU ARE A PASSENGER IN A CAR FOR AN HOUR WITHOUT A BREAK: WOULD NEVER DOZE
HOW LIKELY ARE YOU TO NOD OFF OR FALL ASLEEP IN A CAR, WHILE STOPPED FOR A FEW MINUTES IN TRAFFIC: WOULD NEVER DOZE
HOW LIKELY ARE YOU TO NOD OFF OR FALL ASLEEP WHILE SITTING INACTIVE IN A PUBLIC PLACE: SLIGHT CHANCE OF DOZING
HOW LIKELY ARE YOU TO NOD OFF OR FALL ASLEEP WHILE SITTING QUIETLY AFTER LUNCH WITHOUT ALCOHOL: SLIGHT CHANCE OF DOZING
HOW LIKELY ARE YOU TO NOD OFF OR FALL ASLEEP WHILE SITTING AND READING: SLIGHT CHANCE OF DOZING
HOW LIKELY ARE YOU TO NOD OFF OR FALL ASLEEP WHILE SITTING AND TALKING TO SOMEONE: WOULD NEVER DOZE
HOW LIKELY ARE YOU TO NOD OFF OR FALL ASLEEP WHILE WATCHING TV: MODERATE CHANCE OF DOZING

## 2024-05-03 ENCOUNTER — DOCUMENTATION ONLY (OUTPATIENT)
Dept: SLEEP MEDICINE | Facility: CLINIC | Age: 29
End: 2024-05-03
Payer: COMMERCIAL

## 2024-05-03 NOTE — NURSING NOTE
Pt returned HST device. It was downloaded and forwarded data to the clinical specialist for scoring.

## 2024-05-07 PROCEDURE — 93272 ECG/REVIEW INTERPRET ONLY: CPT | Performed by: INTERNAL MEDICINE

## 2024-05-07 NOTE — PROGRESS NOTES
This HSAT was performed using a Noxturnal T3 device which recorded snore, sound, movement activity, body position, nasal pressure, oronasal thermal airflow, pulse, oximetry and both chest and abdominal respiratory effort. HSAT data was restricted to the time patient states they were in bed.     HSAT was scored using 1B 4% hypopnea rule.     AHI: 5.6  Snoring was reported as loud.  Time with SpO2 below 89% was 0 minutes.   Overall signal quality was good     Pt will follow up with sleep provider to determine appropriate therapy.     Ordering Provider, Tino Patel MD C. Oyugi, BA, RPSGT, RST System Clinical Specialist/ 5/7/2024

## 2024-05-08 NOTE — PROGRESS NOTES
HST POST-STUDY QUESTIONNAIRE    What time did you go to bed?  10pm  How long do you think it took to fall asleep?  20min  What time did you wake up to start the day?  0500  Did you get up during the night at all?  yes  If you woke up, do you remember approximately what time(s)? 2am  Did you have any difficulty with the equipment?  No  Did you us any type of treatment with this study?  None  Was the head of the bed elevated? No  Did you sleep in a recliner?  No  Did you stop using CPAP at least 3 days before this test?  NA  Any other information you'd like us to know? -

## 2024-05-10 NOTE — PROCEDURES
"HOME SLEEP STUDY INTERPRETATION        Patient: Kit Orona  MRN: [id number]  YOB: 1995  Study Date: 2024  PCP/Referring Provider: Nadir Velasco MD  Ordering Provider: Tino Patel MD      Indications for Home Study: Kit Orona is a 28 year old male with history of paroxysmal atrial fibrillation and phenylketonuria who presents with symptoms suggestive of obstructive sleep apnea.    Estimated body mass index is 36.58 kg/m  as calculated from the following:    Height as of 24: 1.88 m (6' 2.02\").    Weight as of 24: 129.3 kg (285 lb).  Total score - [place]: 7 (2024  2:16 PM)  STOP-BAN/8      Data: A full night home sleep study was performed recording the standard physiologic parameters including body position, movement, sound, nasal pressure, thermal oral airflow, chest and abdominal movements with respiratory inductance plethysmography, and oxygen saturation by pulse oximetry. Pulse rate was estimated by oximetry recording. This study was considered adequate based on > 4 hours of quality oximetry and respiratory recording. As specified by the AASM Manual for the Scoring of Sleep and Associated events, version 2.3, Rule VIII.D 1B, 4% oxygen desaturation scoring for hypopneas is used as a standard of care on all home sleep apnea testing.      Analysis Time: 416.2 minutes      Respiration:   Sleep Associated Hypoxemia: sustained hypoxemia was not present.  Average oxygen saturation was 94.1%.  Time with saturation less than or equal to 88% was 0 minutes. The lowest oxygen saturation was 90%.   Snoring: Snoring was present.  Respiratory events: The home study revealed a presence of 14 obstructive apneas and 13 mixed and central apneas. There were 12 hypopneas resulting in a combined apnea/hypopnea index [AHI] of 5.6 events per hour.  AHI was 8.1 per hour supine, n/a prone, 0 per hour on left side, and 4 per hour on right side.   Pattern: " Excluding events noted above, respiratory rate and pattern was Normal.      Position: Percent of time spent: supine -54.9%, prone -0%, on left -16.2%, on right -28.9%.      Heart Rate: By pulse oximetry, the average pulse rate was normal at 60 bpm.  The minimum pulse rate was 48 bpm and the maximum pulse rate was 94 beats minute.       Assessment:   Mild obstructive sleep apnea was present, predominant during supine sleep position. (Both obstructive and central apneas were noted and the respiratory events were mostly obstructive in nature)  Sleep associated hypoxemia was not present.    Recommendations:  Consider positional therapy during sleep using zzoma pillow or other devices of similar type (or) oral appliance through referral to sleep dentistry to control the LI and snoring.  Suggest optimizing sleep hygiene and avoiding sleep deprivation.  Weight management.      Diagnosis Code(s): Obstructive Sleep Apnea G47.33, Snoring R06.83    Electronically signed by: Tino Patel MD, May 9, 2024   Diplomate, American Board of Internal Medicine, Sleep Medicine

## 2024-05-12 ENCOUNTER — MYC MEDICAL ADVICE (OUTPATIENT)
Dept: CARDIOLOGY | Facility: CLINIC | Age: 29
End: 2024-05-12
Payer: COMMERCIAL

## 2024-05-13 NOTE — TELEPHONE ENCOUNTER
Called patient, advised him that individually the PVCs are not concerning but that there is such a thing is too many PVCs.  Patient advised that he still has good heart function per echocardiogram.  Patient was advised to try to avoid caffeine altogether and follow-up with Dr. Jenkins next week as scheduled. Paul GAMBLE

## 2024-05-21 ENCOUNTER — OFFICE VISIT (OUTPATIENT)
Dept: CARDIOLOGY | Facility: CLINIC | Age: 29
End: 2024-05-21
Attending: INTERNAL MEDICINE
Payer: COMMERCIAL

## 2024-05-21 VITALS
WEIGHT: 289.7 LBS | SYSTOLIC BLOOD PRESSURE: 129 MMHG | BODY MASS INDEX: 37.18 KG/M2 | HEIGHT: 74 IN | DIASTOLIC BLOOD PRESSURE: 84 MMHG | OXYGEN SATURATION: 96 % | HEART RATE: 99 BPM

## 2024-05-21 DIAGNOSIS — I48.91 ATRIAL FIBRILLATION WITH RAPID VENTRICULAR RESPONSE (H): Primary | ICD-10-CM

## 2024-05-21 PROCEDURE — G2211 COMPLEX E/M VISIT ADD ON: HCPCS | Performed by: INTERNAL MEDICINE

## 2024-05-21 PROCEDURE — 93000 ELECTROCARDIOGRAM COMPLETE: CPT | Performed by: INTERNAL MEDICINE

## 2024-05-21 PROCEDURE — 99204 OFFICE O/P NEW MOD 45 MIN: CPT | Performed by: INTERNAL MEDICINE

## 2024-05-21 RX ORDER — METOPROLOL TARTRATE 50 MG
TABLET ORAL
Qty: 8 TABLET | Refills: 0 | Status: SHIPPED | OUTPATIENT
Start: 2024-05-21

## 2024-05-21 RX ORDER — FLECAINIDE ACETATE 100 MG/1
TABLET ORAL
Qty: 14 TABLET | Refills: 0 | Status: SHIPPED | OUTPATIENT
Start: 2024-05-21

## 2024-05-21 NOTE — LETTER
"5/21/2024    Nadir Velasco MD  [address]  [place] [address]    RE: Kit Orona       Dear Colleague,     I had the pleasure of seeing Kit Orona in the [facility].  PHYSICIAN NOTE:  This visit was completed in person at the [facility].      I had the pleasure of seeing . Kit Orona for evaluation of atrial fibrillation. He has been referred to EP by Dr. Aly Rios.    Very pleasant 28-year-old male who has probably experienced 4 episodes of AF in his lifetime. The first 2 events occurred in college after heavy drinking caffeine/ETOH (no ECG confirmation), the third in May 2023 (at the time he was using up to 1200 mg of caffeine per day! Awoke around 2 AM with palpitation, required cardioversion in the ER at [facility]) and the most recent event on 4/3/2024.     On April 3 he presented to the ER within 1-2 hours of symptom onset. Initial ECG showed AF with impressively fast VR in the 180s. He was given IV medications to slow the heart rate and was later cardioverted.    During AF events, he is aware of heart racing but does not have lightheadedness, near syncope, chest pain or other severe symptoms.    Reji recently underwent sleep apnea evaluation which revealed \"mild obstructive sleep apnea\".    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 Non-smoker.    Family history is significant for AF on his father's side of the family, his father and paternal grandfather had AF.      PHYSICAL EXAMINATION:  Vital signs: 129/84, 99, 131.4 kg, BMI 37.2  General: extremely pleasant young man, moderately overweight, in no apparent distress, accompanied by his wife  ENT/Mouth:  no nasal discharge.  Eyes:  normal conjunctivae.   Neck:  no thyromegaly or lymphadenopathy.  Chest/Lungs:  patient is not dyspneic.  Lungs CTA, without rales or wheezing  Cardiovascular: normal JVP, rhythm is regular.  No " "gallop, murmur or rub.    Abdomen:  no abdominal distention.  Soft, negative HJR.    Extremities:  no edema  Skin:  no xanthelasma.    Neurologic:  alert & oriented x 3.    Vascular:  2+ carotids without bruits.  2+ radials.        DIAGNOSTIC STUDIES (reviewed/interpreted in the clinic today):  Laboratory studies: TSH 2.86, potassium 4.7, sodium 144, creatinine 0.95, hematocrit 45.7%, platelets 336K  ECG: SR, WNL.  Echocardiogram (04/2024): EF 55 - 60%, normal RV, normal atrial size, no significant valve abnormalities.   Exercise stress test: reviewed. 10 minutes on Claudy protocol, achieving peak heart rate 184 bpm (11.7 METs).  14-day cardiac monitor (04/2024): sinus rhythm throughout, occasional PVCs with associated monitor activation.      IMPRESSION:  Atrial fibrillation with RVR. Two documented events in 05/2023 and 04/2024, the first was preceded by consumption of massive caffeine (up to 1200 mg daily!). He was drinking alcohol and caffeine before the most recent AF event as well.  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 In the setting of this genetic predisposition, consumption of large amounts of alcohol and/or caffeine should be strictly avoided.  He has a structurally normal heart.  [id number] is 0. He does not require anticoagulation.    RECOMMENDATIONS:  Lifestyle changes before consideration of invasive procedures, such as AF ablation.  Avoid caffeine. He may have up to 1 alcoholic beverage per day.  Regular exercise program and weight loss. There is significant likelihood that his \"mild LI\" will resolve or at least improve with weight loss. I asked him to aim for 15-20 pound weight loss this year.  \"Pill-in-the-pocket\" metoprolol and flecainide, use as needed for future AF events:  take metoprolol 100 mg as soon as possible after AF onset. 30 to 40 minutes later, take flecainide 350 mg. Likelihood of conversion within 3 hours is ~70%. If he remains in AF 4 hours after flecainide, he should head to the ER.    It " was my pleasure seeing this delightful patient.  Please feel free to call with any questions.     The longitudinal plan of care for the diagnosis(es)/condition(s) as documented were addressed during this visit. Due to the added complexity in care, I will continue to support Kit in the subsequent management and with ongoing continuity of care.     Vladimir Jenkins MD, [facility]      (Chart documentation was completed, in part, using Dragon voice-recognition software. The note was reviewed, however grammatical and spelling errors may be present.)      CURRENT MEDICATIONS:  Current Outpatient Medications   Medication Sig Dispense Refill    cetirizine (ZYRTEC) 10 MG tablet Take 1 tablet (10 mg) by mouth daily 30 tablet 11    ibuprofen (ADVIL/MOTRIN) 200 MG tablet Take 200 mg by mouth      sapropterin dihydrochloride (KUVAN) 100 MG tablet Take 2500 mg (25 tablets) by mouth everyday with food. 750 tablet 11       ALLERGIES   No Known Allergies    PAST MEDICAL HISTORY:  No past medical history on file.    PAST SURGICAL HISTORY:  No past surgical history on file.    FAMILY HISTORY:  Family History   Problem Relation Age of Onset    No Known Problems Mother     No Known Problems Father     No Known Problems Sister     No Known Problems Brother        SOCIAL HISTORY:  Social History     Socioeconomic History    Marital status: Single   Tobacco Use    Smoking status: Never    Smokeless tobacco: Never    Tobacco comments:     Occ. Vape-maybe twice a month   Substance and Sexual Activity    Alcohol use: Yes       CC  Aly Rios MD  [address]  [place] [address]        Thank you for allowing me to participate in the care of your patient.      Sincerely,     Vladimir Jenkins MD     [facility]

## 2024-05-21 NOTE — PROGRESS NOTES
"PHYSICIAN NOTE:  This visit was completed in person at the [facility].      I had the pleasure of seeing Mr. Kit Orona for evaluation of atrial fibrillation. He has been referred to EP by Dr. Aly Rios.    Very pleasant 28-year-old male who has probably experienced 4 episodes of AF in his lifetime. The first 2 events occurred in college after heavy drinking caffeine/ETOH (no ECG confirmation), the third in May 2023 (at the time he was using up to 1200 mg of caffeine per day! Awoke around 2 AM with palpitation, required cardioversion in the ER at [facility]) and the most recent event on 4/3/2024.     On April 3 he presented to the ER within 1-2 hours of symptom onset. Initial ECG showed AF with impressively fast VR in the 180s. He was given IV medications to slow the heart rate and was later cardioverted.    During AF events, he is aware of heart racing but does not have lightheadedness, near syncope, chest pain or other severe symptoms.    Reji recently underwent sleep apnea evaluation which revealed \"mild obstructive sleep apnea\".    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 Non-smoker.    Family history is significant for AF on his father's side of the family, his father and paternal grandfather had AF.      PHYSICAL EXAMINATION:  Vital signs: 129/84, 99, 131.4 kg, BMI 37.2  General: extremely pleasant young man, moderately overweight, in no apparent distress, accompanied by his wife  ENT/Mouth:  no nasal discharge.  Eyes:  normal conjunctivae.   Neck:  no thyromegaly or lymphadenopathy.  Chest/Lungs:  patient is not dyspneic.  Lungs CTA, without rales or wheezing  Cardiovascular: normal JVP, rhythm is regular.  No gallop, murmur or rub.    Abdomen:  no abdominal distention.  Soft, negative HJR.    Extremities:  no edema  Skin:  no xanthelasma.    Neurologic:  alert & oriented x 3.    Vascular:  2+ carotids without bruits. " " 2+ radials.        DIAGNOSTIC STUDIES (reviewed/interpreted in the clinic today):  Laboratory studies: TSH 2.86, potassium 4.7, sodium 144, creatinine 0.95, hematocrit 45.7%, platelets 336K  ECG: SR, WNL.  Echocardiogram (04/2024): EF 55 - 60%, normal RV, normal atrial size, no significant valve abnormalities.   Exercise stress test: reviewed. 10 minutes on Claudy protocol, achieving peak heart rate 184 bpm (11.7 METs).  14-day cardiac monitor (04/2024): sinus rhythm throughout, occasional PVCs with associated monitor activation.      IMPRESSION:  Atrial fibrillation with RVR. Two documented events in 05/2023 and 04/2024, the first was preceded by consumption of massive caffeine (up to 1200 mg daily!). He was drinking alcohol and caffeine before the most recent AF event as well.  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 In the setting of this genetic predisposition, consumption of large amounts of alcohol and/or caffeine should be strictly avoided.  He has a structurally normal heart.  [id number] is 0. He does not require anticoagulation.    RECOMMENDATIONS:  Lifestyle changes before consideration of invasive procedures, such as AF ablation.  Avoid caffeine. He may have up to 1 alcoholic beverage per day.  Regular exercise program and weight loss. There is significant likelihood that his \"mild LI\" will resolve or at least improve with weight loss. I asked him to aim for 15-20 pound weight loss this year.  \"Pill-in-the-pocket\" metoprolol and flecainide, use as needed for future AF events:  take metoprolol 100 mg as soon as possible after AF onset. 30 to 40 minutes later, take flecainide 350 mg. Likelihood of conversion within 3 hours is ~70%. If he remains in AF 4 hours after flecainide, he should head to the ER.    It was my pleasure seeing this delightful patient.  Please feel free to call with any questions.     The longitudinal plan of care for the diagnosis(es)/condition(s) as documented were addressed during this visit. " Due to the added complexity in care, I will continue to support Kit in the subsequent management and with ongoing continuity of care.     Vladimir Jenkins MD, [facility]      (Chart documentation was completed, in part, using Dragon voice-recognition software. The note was reviewed, however grammatical and spelling errors may be present.)      CURRENT MEDICATIONS:  Current Outpatient Medications   Medication Sig Dispense Refill    cetirizine (ZYRTEC) 10 MG tablet Take 1 tablet (10 mg) by mouth daily 30 tablet 11    ibuprofen (ADVIL/MOTRIN) 200 MG tablet Take 200 mg by mouth      sapropterin dihydrochloride (KUVAN) 100 MG tablet Take 2500 mg (25 tablets) by mouth everyday with food. 750 tablet 11       ALLERGIES   No Known Allergies    PAST MEDICAL HISTORY:  No past medical history on file.    PAST SURGICAL HISTORY:  No past surgical history on file.    FAMILY HISTORY:  Family History   Problem Relation Age of Onset    No Known Problems Mother     No Known Problems Father     No Known Problems Sister     No Known Problems Brother        SOCIAL HISTORY:  Social History     Socioeconomic History    Marital status: Single   Tobacco Use    Smoking status: Never    Smokeless tobacco: Never    Tobacco comments:     Occ. Vape-maybe twice a month   Substance and Sexual Activity    Alcohol use: Yes       CC  Aly Rios MD  [address]  [place] [address]

## 2024-08-21 ENCOUNTER — TELEPHONE (OUTPATIENT)
Dept: CONSULT | Facility: CLINIC | Age: 29
End: 2024-08-21

## 2024-08-21 DIAGNOSIS — E70.1 PHENYLKETONURIA (PKU) (H): ICD-10-CM

## 2024-08-21 RX ORDER — SAPROPTERIN DIHYDROCHLORIDE 100 MG/1
TABLET ORAL
Qty: 750 TABLET | Refills: 11 | Status: SHIPPED | OUTPATIENT
Start: 2024-08-21

## 2024-08-21 NOTE — TELEPHONE ENCOUNTER
Prior Authorization Approval    Medication: SAPROPTERIN DIHYDROCHLORIDE 100 MG PO TABS  Authorization Effective Date: 8/21/2024  Authorization Expiration Date: 8/21/2025  Approved Dose/Quantity: #750 per 30 days  Reference #: Key: [id number]   Insurance Company: [employer] ([facility]) - Phone [phone] Fax [phone]  Expected CoPay: $    CoPay Card Available:      Financial Assistance Needed:   Which Pharmacy is filling the prescription: [facility] [place], [address]  Pharmacy Notified:   Patient Notified:

## 2024-08-21 NOTE — TELEPHONE ENCOUNTER
PA Initiation    Medication: SAPROPTERIN DIHYDROCHLORIDE 100 MG PO TABS  Insurance Company: [employer] ([facility]) - Phone [phone] Fax [phone]  Pharmacy Filling the Rx: [facility] - [place], [place] - [address] [place] [address]  Filling Pharmacy Phone:    Filling Pharmacy Fax:    Start Date: 8/21/2024    Key: [id number]

## 2024-09-30 ENCOUNTER — PHARMACY VISIT (OUTPATIENT)
Dept: ADMINISTRATIVE | Facility: CLINIC | Age: 29
End: 2024-09-30
Payer: COMMERCIAL

## 2024-09-30 NOTE — PROGRESS NOTES
PKU Follow-Up Assessment  Summary Notes    I spoke to Kit for PKU therapy management assessment.     Current Regimen: Sapropterin 2500 mg daily (25 tabs). Weight = 131.4 kg. Dose = 19.0 mg/kg/day.     States he still has several containers on hand but he is taking is almost every day. Set delivery for 10/7 t0 build in some additional buffer time. Too soon for PDC. Denies side effects.   Phe = 13.5 mg/dL (2/10/24). Denies medication or allergy changes.     Follow-up: TM will start quarterly follow-ups or earlier if adherence concerns.        Care Details    What are the patient's goals for this therapy?   ? pt has been getting sapropterin from a different pharmacy for a long period of time. so declined any counselling      Is the patient meeting treatment goals?   ? Yes      Medication being used by the patient to treat their PKU disease:   ? Sapropterin    What is the patient's weight (kg)?   ? 131.4    What is the patient's current sapropterin daily dose (mg)?   ? 2500    What is the patient's current daily sapropterin dose (mg/kg/day)?   ? 19    Is this dose appropriate?   ? Yes    What dosage forms of Kuvan/sapropterin does the patient use?   ? 100mg Oral Tablets         How many doses have been missed in the past month?   ? a few      When was the patient's most recent blood phenylalanine checked?   ? 2024-02-10      What was the patient's most recent blood [Phe] level (mg/dL)?   ? 13.5      Is the patient's [Phe] level at goal? (Goal of 2-6mg/dL)   ? No         Jewell Stewart, PharmD  Therapy Management Pharmacist  [facility]  [email or web address]  [email or web address]  Specialty: [phone]

## 2024-10-03 NOTE — ED TRIAGE NOTES
Pt c/o afib. States he feels like his heart is racing. Denies CP or SOB. Hx afib, states he was cardioverted last year and that was successful. MD at bedside in triage.     Triage Assessment (Adult)       Row Name 04/03/24 0801          Triage Assessment    Airway WDL WDL        Respiratory WDL    Respiratory WDL WDL        Skin Circulation/Temperature WDL    Skin Circulation/Temperature WDL WDL        Cardiac WDL    Cardiac WDL X;rhythm     Pulse Rate & Regularity apical pulse irregular;tachycardic        Peripheral/Neurovascular WDL    Peripheral Neurovascular WDL WDL        Cognitive/Neuro/Behavioral WDL    Cognitive/Neuro/Behavioral WDL WDL                     
Abdominal pain

## 2024-12-18 ENCOUNTER — TELEPHONE (OUTPATIENT)
Dept: CONSULT | Facility: CLINIC | Age: 29
End: 2024-12-18
Payer: COMMERCIAL

## 2024-12-18 NOTE — TELEPHONE ENCOUNTER
This is my second attempt on trying to reach Kit. Kit will be due for his annual visit in January in the PKU Clinic.     Please contact the clinic at [phone] to schedule an appointment.     Thank you,   Lianet

## 2024-12-28 ENCOUNTER — HEALTH MAINTENANCE LETTER (OUTPATIENT)
Age: 29
End: 2024-12-28

## 2025-01-28 ENCOUNTER — TELEPHONE (OUTPATIENT)
Dept: CONSULT | Facility: CLINIC | Age: 30
End: 2025-01-28
Payer: COMMERCIAL

## 2025-01-29 DIAGNOSIS — E70.1 PHENYLKETONURIA (PKU): ICD-10-CM

## 2025-01-29 RX ORDER — SAPROPTERIN DIHYDROCHLORIDE 100 MG/1
TABLET ORAL
Qty: 750 TABLET | Refills: 11 | Status: SHIPPED | OUTPATIENT
Start: 2025-01-29

## 2025-01-29 NOTE — TELEPHONE ENCOUNTER
Prior Authorization Approval    Medication: SAPROPTERIN DIHYDROCHLORIDE 100 MG PO TABS  Authorization Effective Date: 1/29/2025  Authorization Expiration Date: 7/29/2025  Approved Dose/Quantity: 750/30  Reference #: [id number]   Insurance Company: [facility] Prior Auth Dept, phone  [phone], Fax [phone]  Expected CoPay: $    CoPay Card Available:      Financial Assistance Needed:   Which Pharmacy is filling the prescription: [facility]  Pharmacy Notified: yes  Patient Notified: yes

## 2025-01-29 NOTE — TELEPHONE ENCOUNTER
PA Initiation    Medication: SAPROPTERIN DIHYDROCHLORIDE 100 MG PO TABS  Insurance Company: [facility] Prior Auth Dept, phone  [phone], Fax [phone]  Pharmacy Filling the Rx: [facility] - [place], [place] - [address]  Filling Pharmacy Phone:    Filling Pharmacy Fax:    Start Date: 1/29/2025     [id number]

## 2025-01-29 NOTE — TELEPHONE ENCOUNTER
[facility] Prior Authorization Team   Phone: [phone]  Fax: [phone]    PA Needed    Medication: sapropterin dihydrochloride (KUVAN) 100 MG tablet   QTY/DS: 750 tablets every 30 days  NEW INS: Yes  Insurance Company:  [employer]  Pharmacy Filling the Rx:  Unknown. Has been filling through [facility] since 2024, but not sure what patient's new insurance will require.  PA :  New Insurance  Date of last fill: Delivered 25 (one month supply)

## 2025-06-03 ENCOUNTER — OFFICE VISIT (OUTPATIENT)
Dept: CARDIOLOGY | Facility: CLINIC | Age: 30
End: 2025-06-03
Payer: COMMERCIAL

## 2025-06-03 VITALS
DIASTOLIC BLOOD PRESSURE: 70 MMHG | OXYGEN SATURATION: 97 % | HEART RATE: 78 BPM | WEIGHT: 279.5 LBS | SYSTOLIC BLOOD PRESSURE: 126 MMHG | HEIGHT: 74 IN | BODY MASS INDEX: 35.87 KG/M2

## 2025-06-03 DIAGNOSIS — I48.91 ATRIAL FIBRILLATION WITH RAPID VENTRICULAR RESPONSE (H): Primary | ICD-10-CM

## 2025-06-03 NOTE — PATIENT INSTRUCTIONS
Call the nurse for any questions or concerns at [phone].     Plan:  1. Medication changes: none    2. Follow up in 1 year, sooner if needed   -Scheduling phone number: [phone]    It was great seeing you today!    Roula Baron PA-C  Physician Assistant  [facility]

## 2025-06-03 NOTE — PROGRESS NOTES
EP Cardiology Clinic Progress Note  Kit Orona MRN# [id number]   YOB: 1995 Age: 29 year old   Primary Cardiologist: Dr. Savage Braun, Dr. Jenkins (EP) Reason for visit: EP follow-up            Assessment and Plan:   Kit Orona is a very pleasant 29 year old male who is here today for EP follow-up.      1.  Atrial fibrillation RVR, symptomatic. Episodes have occurred in the setting of excess alcohol or caffeine consumption.   2.  PVCs, symptomatic but not bothersome.  Unclear burden.  Recent stress test normal.  LVEF normal.    Changes today: none    Consider repeating echocardiogram in ~2 years given what appear to be frequent PVCs.  Can also consider Zio patch monitor at that time to quantify PVCs    Ongoing avoidance of excess caffeine and alcohol, as these appear to be triggers for patient's atrial fibrillation.    EP follow-up in 1 year, sooner if needed.    Roula Baron PA-C  [facility] [place]  Pager: [phone]          History of Presenting Illness:    Kit Orona is a very pleasant 29 year old male with a history of atrial fibrillation.    Patient has experienced 4 episodes of atrial fibrillation in his lifetime.  The first 2 events occurred in college after heavy drinking of caffeine/EtOH (no ECG confirmation) and the third episode occurred in May 2023 (at the time he was using up to 1200 mg of caffeine daily).  At that time, he awoke around 2 AM with palpitations.  He required cardioversion in the ED.      His most recent episode occurred 4/3/2024 at which time he presented to the ED within 1 to 2 hours of symptom onset.  Initial ECG revealed atrial fibrillation with RVR at 180 bpm.  He was given IV medications to slow the heart rate down and was later cardioverted with successful restoration of normal sinus rhythm.      He underwent evaluation for obstructive sleep apnea which revealed mild LI.      Patient established care with Dr. Jenkins 5/21/2024.   "At that time, lifestyle changes were recommended before considering any invasive procedures.  He was advised to avoid caffeine and consume only 1 alcoholic beverage in a day.  Weight loss was also recommended.  He was prescribed as needed metoprolol and pill in the pocket flecainide.    Patient is here today for annual EP follow-up.  Kit has been feeling very well over the last 1 year.  He denies episodes of chest pain, palpitations, lightheadedness, dizziness, near-syncope or syncope.  No orthopnea or PND.  No recurrent atrial fibrillation to his knowledge.    He has been working hard to not consume caffeine or alcohol in excess.    Blood pressure 126/70 and HR 78 in clinic today. ECG completed in clinic today reveals normal sinus rhythm with a PVC, VR 72, , QRS 90 QTc 362/384 ms.        Social History       Social History     Socioeconomic History    Marital status: Single     Spouse name: Not on file    Number of children: Not on file    Years of education: Not on file    Highest education level: Not on file   Occupational History    Not on file   Tobacco Use    Smoking status: Never    Smokeless tobacco: Never    Tobacco comments:     Occ. Vape-maybe twice a month   Substance and Sexual Activity    Alcohol use: Yes     Comment: socially    Drug use: Not on file    Sexual activity: Not on file   Other Topics Concern    Not on file   Social History Narrative    Not on file     Social Drivers of Health     Financial Resource Strain: Not on file   Food Insecurity: Not on file   Transportation Needs: Not on file   Physical Activity: Not on file   Stress: Not on file   Social Connections: Not on file   Interpersonal Safety: Not on file   Housing Stability: Not on file            Review of Systems:   Please see HPI         Physical Exam:   Vitals: /70 (BP Location: Right arm, Patient Position: Sitting, Cuff Size: Adult Large)   Pulse 78   Ht 1.88 m (6' 2\")   Wt 126.8 kg (279 lb 8 oz)   SpO2 97%   " BMI 35.89 kg/m     Wt Readings from Last 4 Encounters:   06/03/25 126.8 kg (279 lb 8 oz)   05/21/24 131.4 kg (289 lb 11.2 oz)   04/30/24 129.3 kg (285 lb)   04/09/24 129.6 kg (285 lb 12.8 oz)     GEN: well nourished, in no acute distress.  HEENT:  Pupils equal, round. Sclerae nonicteric.   NECK: Supple, no masses appreciated. No JVD  C/V:  Regular rate and rhythm, no murmur, rub or gallop.    RESP: Respirations are unlabored. Clear to auscultation bilaterally without wheezing, rales, or rhonchi.  GI: Abdomen soft, nontender.  EXTREM: no LE edema.  NEURO: Alert and oriented, cooperative.  SKIN: Warm and dry.        Data:   LIPID RESULTS:  Lab Results   Component Value Date    CHOL 145 07/14/2011    HDL 63 07/14/2011    LDL 66 07/14/2011    TRIG 77 07/14/2011    CHOLHDLRATIO 2.0 07/14/2011     LIVER ENZYME RESULTS:  Lab Results   Component Value Date    AST 40 04/03/2024    ALT 51 04/03/2024    ALT 31 07/14/2011     CBC RESULTS:  Lab Results   Component Value Date    WBC 10.2 04/03/2024    RBC 5.52 04/03/2024    HGB 15.3 04/03/2024    HGB 14.2 04/09/2009    HCT 45.7 04/03/2024    MCV 83 04/03/2024    MCH 27.7 04/03/2024    MCHC 33.5 04/03/2024    RDW 13.3 04/03/2024     04/03/2024     BMP RESULTS:  Lab Results   Component Value Date     04/03/2024     07/14/2011    POTASSIUM 4.7 04/03/2024    POTASSIUM 4.3 05/14/2019    POTASSIUM 4.0 07/14/2011    CHLORIDE 111 (H) 04/03/2024    CHLORIDE 105 05/14/2019    CHLORIDE 108 07/14/2011    CO2 23 04/03/2024    CO2 24 05/14/2019    CO2 25 07/14/2011    ANIONGAP 10 04/03/2024    ANIONGAP 12 05/14/2019    ANIONGAP 8.1 07/14/2011    GLC 90 04/03/2024    GLC 97 05/14/2019    GLC 66 07/14/2011    BUN 8.3 04/03/2024    BUN 13 05/14/2019    BUN 12 07/14/2011    CR 0.95 04/03/2024    CR 0.80 07/14/2011    GFRESTIMATED >90 04/03/2024    GFRESTIMATED GFR not calculated, patient <16 years old. 07/14/2011    GFRESTBLACK GFR not calculated, patient <16 years old.  "07/14/2011    ANUJA 10.1 (H) 04/03/2024    ANUJA 9.1 07/14/2011      A1C RESULTS:  No results found for: \"A1C\"  INR RESULTS:  No results found for: \"INR\"         Medications     Current Outpatient Medications   Medication Sig Dispense Refill    cetirizine (ZYRTEC) 10 MG tablet Take 1 tablet (10 mg) by mouth daily (Patient taking differently: Take 10 mg by mouth as needed) 30 tablet 11    Coenzyme Q10 (CO Q 10 PO) Takes 1-2 times per week when feeling PVC's, OTC      flecainide (TAMBOCOR) 100 MG tablet Take 3.5 tablets (350 mg) approximately 30-40 minutes after taking metoprolol (use as needed during AF episode). 14 tablet 0    ibuprofen (ADVIL/MOTRIN) 200 MG tablet Take 200 mg by mouth as needed      MAGNESIUM PO Takes 1-2 times per week onset of PVC's, OTC      metoprolol tartrate (LOPRESSOR) 50 MG tablet Take 2 tabs (100 mg) as soon as possible after AF onset 8 tablet 0    sapropterin dihydrochloride (KUVAN) 100 MG tablet Take 2500 mg (25 tablets) by mouth everyday with food. 750 tablet 11    UNABLE TO FIND daily MEDICATION NAME: Calcium, Vitamin D3 and Zinc, OTC            Past Medical History   No past medical history on file.  No past surgical history on file.  Family History   Problem Relation Age of Onset    No Known Problems Mother     No Known Problems Father     No Known Problems Sister     No Known Problems Brother             Allergies   Patient has no known allergies.    The longitudinal plan of care for the diagnosis(es)/condition(s) as documented were addressed during this visit. Due to the added complexity in care, I will continue to support Kit in the subsequent management and with ongoing continuity of care.     30 minutes spent on the date of the encounter doing chart review, history and exam, documentation and further activities as noted above    Roula Baron PA-C  [facility]  Pager: [phone]    "

## 2025-06-03 NOTE — LETTER
6/3/2025    Nadir Velasco MD  [address]  [place] [address]    RE: Kit Orona       Dear Colleague,     I had the pleasure of seeing Kit Orona in the [facility].  EP Cardiology Clinic Progress Note  Kit Orona MRN# [id number]   YOB: 1995 Age: 29 year old   Primary Cardiologist: Dr. Savage Braun, Dr. Jenkins (EP) Reason for visit: EP follow-up            Assessment and Plan:   Kit Orona is a very pleasant 29 year old male who is here today for EP follow-up.      1.  Atrial fibrillation RVR, symptomatic. Episodes have occurred in the setting of excess alcohol or caffeine consumption.   2.  PVCs, symptomatic but not bothersome.  Unclear burden.  Recent stress test normal.  LVEF normal.    Changes today: none    Consider repeating echocardiogram in ~2 years given what appear to be frequent PVCs.  Can also consider Zio patch monitor at that time to quantify PVCs    Ongoing avoidance of excess caffeine and alcohol, as these appear to be triggers for patient's atrial fibrillation.    EP follow-up in 1 year, sooner if needed.    ZENOBIA Coker [facility]  Pager: [phone]          History of Presenting Illness:    Kit Orona is a very pleasant 29 year old male with a history of atrial fibrillation.    Patient has experienced 4 episodes of atrial fibrillation in his lifetime.  The first 2 events occurred in college after heavy drinking of caffeine/EtOH (no ECG confirmation) and the third episode occurred in May 2023 (at the time he was using up to 1200 mg of caffeine daily).  At that time, he awoke around 2 AM with palpitations.  He required cardioversion in the ED.      His most recent episode occurred 4/3/2024 at which time he presented to the ED within 1 to 2 hours of symptom onset.  Initial ECG revealed atrial fibrillation with RVR at 180 bpm.  He was given IV medications to slow the heart rate down and was  later cardioverted with successful restoration of normal sinus rhythm.      He underwent evaluation for obstructive sleep apnea which revealed mild LI.      Patient established care with Dr. Jenkins 5/21/2024.  At that time, lifestyle changes were recommended before considering any invasive procedures.  He was advised to avoid caffeine and consume only 1 alcoholic beverage in a day.  Weight loss was also recommended.  He was prescribed as needed metoprolol and pill in the pocket flecainide.    Patient is here today for annual EP follow-up.  Kit has been feeling very well over the last 1 year.  He denies episodes of chest pain, palpitations, lightheadedness, dizziness, near-syncope or syncope.  No orthopnea or PND.  No recurrent atrial fibrillation to his knowledge.    He has been working hard to not consume caffeine or alcohol in excess.    Blood pressure 126/70 and HR 78 in clinic today. ECG completed in clinic today reveals normal sinus rhythm with a PVC, VR 72, , QRS 90 QTc 362/384 ms.        Social History       Social History     Socioeconomic History     Marital status: Single     Spouse name: Not on file     Number of children: Not on file     Years of education: Not on file     Highest education level: Not on file   Occupational History     Not on file   Tobacco Use     Smoking status: Never     Smokeless tobacco: Never     Tobacco comments:     Occ. Vape-maybe twice a month   Substance and Sexual Activity     Alcohol use: Yes     Comment: socially     Drug use: Not on file     Sexual activity: Not on file   Other Topics Concern     Not on file   Social History Narrative     Not on file     Social Drivers of Health     Financial Resource Strain: Not on file   Food Insecurity: Not on file   Transportation Needs: Not on file   Physical Activity: Not on file   Stress: Not on file   Social Connections: Not on file   Interpersonal Safety: Not on file   Housing Stability: Not on file            Review of  "Systems:   Please see HPI         Physical Exam:   Vitals: /70 (BP Location: Right arm, Patient Position: Sitting, Cuff Size: Adult Large)   Pulse 78   Ht 1.88 m (6' 2\")   Wt 126.8 kg (279 lb 8 oz)   SpO2 97%   BMI 35.89 kg/m     Wt Readings from Last 4 Encounters:   06/03/25 126.8 kg (279 lb 8 oz)   05/21/24 131.4 kg (289 lb 11.2 oz)   04/30/24 129.3 kg (285 lb)   04/09/24 129.6 kg (285 lb 12.8 oz)     GEN: well nourished, in no acute distress.  HEENT:  Pupils equal, round. Sclerae nonicteric.   NECK: Supple, no masses appreciated. No JVD  C/V:  Regular rate and rhythm, no murmur, rub or gallop.    RESP: Respirations are unlabored. Clear to auscultation bilaterally without wheezing, rales, or rhonchi.  GI: Abdomen soft, nontender.  EXTREM: no LE edema.  NEURO: Alert and oriented, cooperative.  SKIN: Warm and dry.        Data:   LIPID RESULTS:  Lab Results   Component Value Date    CHOL 145 07/14/2011    HDL 63 07/14/2011    LDL 66 07/14/2011    TRIG 77 07/14/2011    CHOLHDLRATIO 2.0 07/14/2011     LIVER ENZYME RESULTS:  Lab Results   Component Value Date    AST 40 04/03/2024    ALT 51 04/03/2024    ALT 31 07/14/2011     CBC RESULTS:  Lab Results   Component Value Date    WBC 10.2 04/03/2024    RBC 5.52 04/03/2024    HGB 15.3 04/03/2024    HGB 14.2 04/09/2009    HCT 45.7 04/03/2024    MCV 83 04/03/2024    MCH 27.7 04/03/2024    MCHC 33.5 04/03/2024    RDW 13.3 04/03/2024     04/03/2024     BMP RESULTS:  Lab Results   Component Value Date     04/03/2024     07/14/2011    POTASSIUM 4.7 04/03/2024    POTASSIUM 4.3 05/14/2019    POTASSIUM 4.0 07/14/2011    CHLORIDE 111 (H) 04/03/2024    CHLORIDE 105 05/14/2019    CHLORIDE 108 07/14/2011    CO2 23 04/03/2024    CO2 24 05/14/2019    CO2 25 07/14/2011    ANIONGAP 10 04/03/2024    ANIONGAP 12 05/14/2019    ANIONGAP 8.1 07/14/2011    GLC 90 04/03/2024    GLC 97 05/14/2019    GLC 66 07/14/2011    BUN 8.3 04/03/2024    BUN 13 05/14/2019    BUN 12 " "07/14/2011    CR 0.95 04/03/2024    CR 0.80 07/14/2011    GFRESTIMATED >90 04/03/2024    GFRESTIMATED GFR not calculated, patient <16 years old. 07/14/2011    GFRESTBLACK GFR not calculated, patient <16 years old. 07/14/2011    ANUJA 10.1 (H) 04/03/2024    ANUJA 9.1 07/14/2011      A1C RESULTS:  No results found for: \"A1C\"  INR RESULTS:  No results found for: \"INR\"         Medications     Current Outpatient Medications   Medication Sig Dispense Refill     cetirizine (ZYRTEC) 10 MG tablet Take 1 tablet (10 mg) by mouth daily (Patient taking differently: Take 10 mg by mouth as needed) 30 tablet 11     Coenzyme Q10 (CO Q 10 PO) Takes 1-2 times per week when feeling PVC's, OTC       flecainide (TAMBOCOR) 100 MG tablet Take 3.5 tablets (350 mg) approximately 30-40 minutes after taking metoprolol (use as needed during AF episode). 14 tablet 0     ibuprofen (ADVIL/MOTRIN) 200 MG tablet Take 200 mg by mouth as needed       MAGNESIUM PO Takes 1-2 times per week onset of PVC's, OTC       metoprolol tartrate (LOPRESSOR) 50 MG tablet Take 2 tabs (100 mg) as soon as possible after AF onset 8 tablet 0     sapropterin dihydrochloride (KUVAN) 100 MG tablet Take 2500 mg (25 tablets) by mouth everyday with food. 750 tablet 11     UNABLE TO FIND daily MEDICATION NAME: Calcium, Vitamin D3 and Zinc, OTC            Past Medical History   No past medical history on file.  No past surgical history on file.  Family History   Problem Relation Age of Onset     No Known Problems Mother      No Known Problems Father      No Known Problems Sister      No Known Problems Brother             Allergies   Patient has no known allergies.    The longitudinal plan of care for the diagnosis(es)/condition(s) as documented were addressed during this visit. Due to the added complexity in care, I will continue to support Kit in the subsequent management and with ongoing continuity of care.     30 minutes spent on the date of the encounter doing chart review, " history and exam, documentation and further activities as noted above    ZENOBIA Coker [facility]  Pager: [phone]      Thank you for allowing me to participate in the care of your patient.      Sincerely,     ZENOBIA Coker [facility]  cc:   Roula Baron PA-C  [address]  [place] [address]

## 2025-06-19 ENCOUNTER — TELEPHONE (OUTPATIENT)
Dept: CONSULT | Facility: CLINIC | Age: 30
End: 2025-06-19
Payer: COMMERCIAL

## 2025-06-23 NOTE — TELEPHONE ENCOUNTER
Prior Authorization Approval    Medication: SAPROPTERIN DIHYDROCHLORIDE 100 MG PO TABS  Authorization Effective Date: 6/21/2025  Authorization Expiration Date: 12/21/2025  Approved Dose/Quantity: 750/30  Reference #: [id number]   Insurance Company: [facility] Prior Auth Dept, phone  [phone], Fax [phone]  Expected CoPay: $    CoPay Card Available:      Financial Assistance Needed:   Which Pharmacy is filling the prescription: [facility] - [place]  Pharmacy Notified: renewal  Patient Notified: renewal

## (undated) RX ORDER — ACETAMINOPHEN 325 MG/1
TABLET ORAL
Status: DISPENSED
Start: 2022-05-06

## (undated) RX ORDER — MONTELUKAST SODIUM 10 MG/1
TABLET ORAL
Status: DISPENSED
Start: 2022-05-13

## (undated) RX ORDER — DIPHENHYDRAMINE HCL 25 MG
CAPSULE ORAL
Status: DISPENSED
Start: 2022-05-06

## (undated) RX ORDER — DIPHENHYDRAMINE HCL 25 MG
CAPSULE ORAL
Status: DISPENSED
Start: 2022-05-13

## (undated) RX ORDER — ACETAMINOPHEN 325 MG/1
TABLET ORAL
Status: DISPENSED
Start: 2022-05-13